# Patient Record
Sex: MALE | Race: NATIVE HAWAIIAN OR OTHER PACIFIC ISLANDER | NOT HISPANIC OR LATINO | URBAN - METROPOLITAN AREA
[De-identification: names, ages, dates, MRNs, and addresses within clinical notes are randomized per-mention and may not be internally consistent; named-entity substitution may affect disease eponyms.]

---

## 2023-09-20 ENCOUNTER — INPATIENT (INPATIENT)
Facility: HOSPITAL | Age: 38
LOS: 2 days | Discharge: NURSING, REHAB OR LONG-TERM CARE | DRG: 897 | End: 2023-09-23
Attending: STUDENT IN AN ORGANIZED HEALTH CARE EDUCATION/TRAINING PROGRAM | Admitting: STUDENT IN AN ORGANIZED HEALTH CARE EDUCATION/TRAINING PROGRAM
Payer: MEDICAID

## 2023-09-20 VITALS
OXYGEN SATURATION: 100 % | RESPIRATION RATE: 22 BRPM | WEIGHT: 199.96 LBS | DIASTOLIC BLOOD PRESSURE: 74 MMHG | HEART RATE: 112 BPM | HEIGHT: 70 IN | SYSTOLIC BLOOD PRESSURE: 144 MMHG

## 2023-09-20 DIAGNOSIS — E80.6 OTHER DISORDERS OF BILIRUBIN METABOLISM: ICD-10-CM

## 2023-09-20 DIAGNOSIS — Z29.9 ENCOUNTER FOR PROPHYLACTIC MEASURES, UNSPECIFIED: ICD-10-CM

## 2023-09-20 DIAGNOSIS — R65.10 SYSTEMIC INFLAMMATORY RESPONSE SYNDROME (SIRS) OF NON-INFECTIOUS ORIGIN WITHOUT ACUTE ORGAN DYSFUNCTION: ICD-10-CM

## 2023-09-20 DIAGNOSIS — R79.89 OTHER SPECIFIED ABNORMAL FINDINGS OF BLOOD CHEMISTRY: ICD-10-CM

## 2023-09-20 DIAGNOSIS — F10.10 ALCOHOL ABUSE, UNCOMPLICATED: ICD-10-CM

## 2023-09-20 DIAGNOSIS — F10.939 ALCOHOL USE, UNSPECIFIED WITH WITHDRAWAL, UNSPECIFIED: ICD-10-CM

## 2023-09-20 DIAGNOSIS — E83.39 OTHER DISORDERS OF PHOSPHORUS METABOLISM: ICD-10-CM

## 2023-09-20 LAB
ALBUMIN SERPL ELPH-MCNC: 4.6 G/DL — SIGNIFICANT CHANGE UP (ref 3.3–5)
ALP SERPL-CCNC: 100 U/L — SIGNIFICANT CHANGE UP (ref 40–120)
ALT FLD-CCNC: 131 U/L — HIGH (ref 10–45)
ANION GAP SERPL CALC-SCNC: 22 MMOL/L — HIGH (ref 5–17)
AST SERPL-CCNC: 181 U/L — HIGH (ref 10–40)
BASOPHILS # BLD AUTO: 0.03 K/UL — SIGNIFICANT CHANGE UP (ref 0–0.2)
BASOPHILS NFR BLD AUTO: 0.5 % — SIGNIFICANT CHANGE UP (ref 0–2)
BILIRUB SERPL-MCNC: 2.6 MG/DL — HIGH (ref 0.2–1.2)
BUN SERPL-MCNC: 12 MG/DL — SIGNIFICANT CHANGE UP (ref 7–23)
CALCIUM SERPL-MCNC: 9.5 MG/DL — SIGNIFICANT CHANGE UP (ref 8.4–10.5)
CHLORIDE SERPL-SCNC: 91 MMOL/L — LOW (ref 96–108)
CO2 SERPL-SCNC: 21 MMOL/L — LOW (ref 22–31)
CREAT SERPL-MCNC: 0.78 MG/DL — SIGNIFICANT CHANGE UP (ref 0.5–1.3)
EGFR: 117 ML/MIN/1.73M2 — SIGNIFICANT CHANGE UP
EOSINOPHIL # BLD AUTO: 0.04 K/UL — SIGNIFICANT CHANGE UP (ref 0–0.5)
EOSINOPHIL NFR BLD AUTO: 0.6 % — SIGNIFICANT CHANGE UP (ref 0–6)
GLUCOSE SERPL-MCNC: 118 MG/DL — HIGH (ref 70–99)
HCT VFR BLD CALC: 43.1 % — SIGNIFICANT CHANGE UP (ref 39–50)
HGB BLD-MCNC: 15.6 G/DL — SIGNIFICANT CHANGE UP (ref 13–17)
IMM GRANULOCYTES NFR BLD AUTO: 0.2 % — SIGNIFICANT CHANGE UP (ref 0–0.9)
LIDOCAIN IGE QN: 32 U/L — SIGNIFICANT CHANGE UP (ref 7–60)
LYMPHOCYTES # BLD AUTO: 2.33 K/UL — SIGNIFICANT CHANGE UP (ref 1–3.3)
LYMPHOCYTES # BLD AUTO: 37.6 % — SIGNIFICANT CHANGE UP (ref 13–44)
MAGNESIUM SERPL-MCNC: 2 MG/DL — SIGNIFICANT CHANGE UP (ref 1.6–2.6)
MCHC RBC-ENTMCNC: 30.5 PG — SIGNIFICANT CHANGE UP (ref 27–34)
MCHC RBC-ENTMCNC: 36.2 GM/DL — HIGH (ref 32–36)
MCV RBC AUTO: 84.2 FL — SIGNIFICANT CHANGE UP (ref 80–100)
MONOCYTES # BLD AUTO: 0.52 K/UL — SIGNIFICANT CHANGE UP (ref 0–0.9)
MONOCYTES NFR BLD AUTO: 8.4 % — SIGNIFICANT CHANGE UP (ref 2–14)
NEUTROPHILS # BLD AUTO: 3.26 K/UL — SIGNIFICANT CHANGE UP (ref 1.8–7.4)
NEUTROPHILS NFR BLD AUTO: 52.7 % — SIGNIFICANT CHANGE UP (ref 43–77)
NRBC # BLD: 0 /100 WBCS — SIGNIFICANT CHANGE UP (ref 0–0)
PLATELET # BLD AUTO: 291 K/UL — SIGNIFICANT CHANGE UP (ref 150–400)
POTASSIUM SERPL-MCNC: 4.1 MMOL/L — SIGNIFICANT CHANGE UP (ref 3.5–5.3)
POTASSIUM SERPL-SCNC: 4.1 MMOL/L — SIGNIFICANT CHANGE UP (ref 3.5–5.3)
PROT SERPL-MCNC: 8.2 G/DL — SIGNIFICANT CHANGE UP (ref 6–8.3)
RBC # BLD: 5.12 M/UL — SIGNIFICANT CHANGE UP (ref 4.2–5.8)
RBC # FLD: 13.9 % — SIGNIFICANT CHANGE UP (ref 10.3–14.5)
SODIUM SERPL-SCNC: 134 MMOL/L — LOW (ref 135–145)
WBC # BLD: 6.19 K/UL — SIGNIFICANT CHANGE UP (ref 3.8–10.5)
WBC # FLD AUTO: 6.19 K/UL — SIGNIFICANT CHANGE UP (ref 3.8–10.5)

## 2023-09-20 PROCEDURE — 99223 1ST HOSP IP/OBS HIGH 75: CPT

## 2023-09-20 PROCEDURE — 93010 ELECTROCARDIOGRAM REPORT: CPT

## 2023-09-20 PROCEDURE — 99291 CRITICAL CARE FIRST HOUR: CPT

## 2023-09-20 RX ORDER — DIAZEPAM 5 MG
10 TABLET ORAL ONCE
Refills: 0 | Status: DISCONTINUED | OUTPATIENT
Start: 2023-09-20 | End: 2023-09-20

## 2023-09-20 RX ORDER — FOLIC ACID 0.8 MG
1 TABLET ORAL DAILY
Refills: 0 | Status: DISCONTINUED | OUTPATIENT
Start: 2023-09-20 | End: 2023-09-23

## 2023-09-20 RX ORDER — SODIUM CHLORIDE 9 MG/ML
1000 INJECTION INTRAMUSCULAR; INTRAVENOUS; SUBCUTANEOUS ONCE
Refills: 0 | Status: COMPLETED | OUTPATIENT
Start: 2023-09-20 | End: 2023-09-20

## 2023-09-20 RX ORDER — THIAMINE MONONITRATE (VIT B1) 100 MG
100 TABLET ORAL DAILY
Refills: 0 | Status: DISCONTINUED | OUTPATIENT
Start: 2023-09-20 | End: 2023-09-23

## 2023-09-20 RX ORDER — FOLIC ACID 0.8 MG
1 TABLET ORAL ONCE
Refills: 0 | Status: COMPLETED | OUTPATIENT
Start: 2023-09-20 | End: 2023-09-20

## 2023-09-20 RX ORDER — ONDANSETRON 8 MG/1
4 TABLET, FILM COATED ORAL EVERY 8 HOURS
Refills: 0 | Status: DISCONTINUED | OUTPATIENT
Start: 2023-09-20 | End: 2023-09-20

## 2023-09-20 RX ORDER — SODIUM CHLORIDE 9 MG/ML
1000 INJECTION INTRAMUSCULAR; INTRAVENOUS; SUBCUTANEOUS
Refills: 0 | Status: DISCONTINUED | OUTPATIENT
Start: 2023-09-20 | End: 2023-09-23

## 2023-09-20 RX ORDER — HEPARIN SODIUM 5000 [USP'U]/ML
5000 INJECTION INTRAVENOUS; SUBCUTANEOUS EVERY 8 HOURS
Refills: 0 | Status: DISCONTINUED | OUTPATIENT
Start: 2023-09-20 | End: 2023-09-22

## 2023-09-20 RX ORDER — ACETAMINOPHEN 500 MG
650 TABLET ORAL EVERY 6 HOURS
Refills: 0 | Status: DISCONTINUED | OUTPATIENT
Start: 2023-09-20 | End: 2023-09-20

## 2023-09-20 RX ORDER — LANOLIN ALCOHOL/MO/W.PET/CERES
3 CREAM (GRAM) TOPICAL AT BEDTIME
Refills: 0 | Status: DISCONTINUED | OUTPATIENT
Start: 2023-09-20 | End: 2023-09-23

## 2023-09-20 RX ORDER — THIAMINE MONONITRATE (VIT B1) 100 MG
100 TABLET ORAL ONCE
Refills: 0 | Status: COMPLETED | OUTPATIENT
Start: 2023-09-20 | End: 2023-09-20

## 2023-09-20 RX ORDER — DIAZEPAM 5 MG
5 TABLET ORAL ONCE
Refills: 0 | Status: DISCONTINUED | OUTPATIENT
Start: 2023-09-20 | End: 2023-09-20

## 2023-09-20 RX ADMIN — Medication 75 MILLIGRAM(S): at 23:44

## 2023-09-20 RX ADMIN — Medication 10 MILLIGRAM(S): at 21:33

## 2023-09-20 RX ADMIN — SODIUM CHLORIDE 1000 MILLILITER(S): 9 INJECTION INTRAMUSCULAR; INTRAVENOUS; SUBCUTANEOUS at 21:30

## 2023-09-20 RX ADMIN — Medication 1 MILLIGRAM(S): at 22:47

## 2023-09-20 RX ADMIN — Medication 100 MILLIGRAM(S): at 21:49

## 2023-09-20 RX ADMIN — Medication 5 MILLIGRAM(S): at 21:49

## 2023-09-20 RX ADMIN — Medication 1 TABLET(S): at 23:44

## 2023-09-20 NOTE — H&P ADULT - ASSESSMENT
38 yo M with history of alcohol abuse with previous withdrawals but never experienced seizures/intubations, anxiety presenting with alcohol withdrawal.

## 2023-09-20 NOTE — H&P ADULT - HISTORY OF PRESENT ILLNESS
36 yo M with history of alcohol abuse with previous withdrawals but never experienced seizures/intubations, anxiety presenting with alcohol withdrawal. Patient reports he was visiting his mother at the hospital and stayed over for 24 hours. he started experiencing tremors, anxiety and diaphoresis so he decided to go to the ED. Patient reports he usually drinks two 24 ounces of beer per day. He denies any drug use.     In the ED, patient was afebrile /74, , tachypneic 22. CBC unremarkable. CMP with mild hyponatremia 134. AG 22. Tot bilirubin 2.6.  and . Lipase normal. EKG showed sinus tach, no ischemic changes, QTc 473. Patient with CIWA 17 for anxiety/tremors/diaphoresis, and received valium 5 mg and 10 mg.  38 yo M with history of alcohol abuse with previous withdrawals but never experienced seizures/intubations, anxiety presenting with alcohol withdrawal. Patient reports he was visiting his mother at the hospital and stayed over for 24 hours. he started experiencing tremors, anxiety and diaphoresis so he decided to go to the ED. Patient reports he has been drinking on and off for years, reports he usually drinks 3-4 24 ounces of beer per day. He denies any drug use. He reports being admitted in [place] for alcohol withdrawal in 2014, uncomplicated.     In the ED, patient was afebrile /74, , tachypneic 22. CBC unremarkable. CMP with mild hyponatremia 134. AG 22. Tot bilirubin 2.6.  and . Lipase normal. EKG showed sinus tach, no ischemic changes, QTc 473. Patient with CIWA 17 for anxiety/tremors/diaphoresis, and received valium 5 mg and 10 mg.

## 2023-09-20 NOTE — H&P ADULT - NSHPLABSRESULTS_GEN_ALL_CORE
15.6   6.19  )-----------( 291      ( 20 Sep 2023 21:32 )             43.1       09-20    134<L>  |  91<L>  |  12  ----------------------------<  118<H>  4.1   |  21<L>  |  0.78    Ca    9.5      20 Sep 2023 21:32  Mg     2.0     09-20    TPro  8.2  /  Alb  4.6  /  TBili  2.6<H>  /  DBili  x   /  AST  181<H>  /  ALT  131<H>  /  AlkPhos  100  09-20              Urinalysis Basic - ( 20 Sep 2023 21:32 )    Color: x / Appearance: x / SG: x / pH: x  Gluc: 118 mg/dL / Ketone: x  / Bili: x / Urobili: x   Blood: x / Protein: x / Nitrite: x   Leuk Esterase: x / RBC: x / WBC x   Sq Epi: x / Non Sq Epi: x / Bacteria: x                  CAPILLARY BLOOD GLUCOSE      POCT Blood Glucose.: 111 mg/dL (20 Sep 2023 21:26)

## 2023-09-20 NOTE — H&P ADULT - NSHPPHYSICALEXAM_GEN_ALL_CORE
VITAL SIGNS:  T(C): 36.5 (09-20-23 @ 21:45), Max: 36.5 (09-20-23 @ 21:45)  T(F): 97.7 (09-20-23 @ 21:45), Max: 97.7 (09-20-23 @ 21:45)  HR: 78 (09-20-23 @ 21:45) (78 - 112)  BP: 138/72 (09-20-23 @ 21:45) (138/72 - 144/74)  BP(mean): --  RR: 18 (09-20-23 @ 21:45) (18 - 22)  SpO2: 100% (09-20-23 @ 21:45) (100% - 100%)  Wt(kg): --    PHYSICAL EXAM:    Constitutional: NAD  Head: NC/AT  Eyes: PERRL, EOMI, anicteric sclera  ENT: no nasal discharge; uvula midline, no oropharyngeal erythema or exudates; MMM  Neck: supple; no JVD or thyromegaly  Respiratory: CTA B/L; no W/R/R, no retractions  Cardiac: +S1/S2; RRR; no M/R/G; PMI non-displaced  Gastrointestinal: abdomen soft, NT/ND; no rebound or guarding; +BSx4  Back: spine midline, no bony tenderness or step-offs; no CVAT B/L  Extremities: WWP, no clubbing or cyanosis; no peripheral edema  Musculoskeletal: NROM x4; no joint swelling, tenderness or erythema  Vascular: 2+ radial, femoral, DP/PT pulses B/L  Dermatologic: skin warm, dry and intact; no rashes, wounds, or scars  Lymphatic: no submandibular or cervical LAD  Neurologic: AAOx3; CNII-XII grossly intact; no focal deficits VITAL SIGNS:  T(C): 36.5 (09-20-23 @ 21:45), Max: 36.5 (09-20-23 @ 21:45)  T(F): 97.7 (09-20-23 @ 21:45), Max: 97.7 (09-20-23 @ 21:45)  HR: 78 (09-20-23 @ 21:45) (78 - 112)  BP: 138/72 (09-20-23 @ 21:45) (138/72 - 144/74)  BP(mean): --  RR: 18 (09-20-23 @ 21:45) (18 - 22)  SpO2: 100% (09-20-23 @ 21:45) (100% - 100%)  Wt(kg): --    PHYSICAL EXAM:    Constitutional: anxious appearing  Head: NC/AT  Eyes: PERRL, EOMI, anicteric sclera  ENT: no nasal discharge; uvula midline, no oropharyngeal erythema or exudates; MMM  Neck: supple; no JVD or thyromegaly  Respiratory: CTA B/L; no W/R/R, no retractions  Cardiac: +S1/S2; RRR; no M/R/G; PMI non-displaced  Gastrointestinal: abdomen soft, NT/ND; no rebound or guarding; +BSx4  Back: spine midline, no bony tenderness or step-offs; no CVAT B/L  Extremities: WWP, no clubbing or cyanosis; no peripheral edema  Musculoskeletal: NROM x4; no joint swelling, tenderness or erythema  Vascular: 2+ radial, femoral, DP/PT pulses B/L  Dermatologic: skin warm, dry and intact; no rashes, wounds, or scars  Lymphatic: no submandibular or cervical LAD  Neurologic: AAOx3; CNII-XII grossly intact; no focal deficits, tremors with arms extended  CIWA 9 at 12h30 am.

## 2023-09-20 NOTE — ED ADULT NURSE NOTE - NSFALLUNIVINTERV_ED_ALL_ED
Bed/Stretcher in lowest position, wheels locked, appropriate side rails in place/Call bell, personal items and telephone in reach/Instruct patient to call for assistance before getting out of bed/chair/stretcher/Non-slip footwear applied when patient is off stretcher/[place] to call system/Physically safe environment - no spills, clutter or unnecessary equipment/Purposeful proactive rounding/Room/bathroom lighting operational, light cord in reach

## 2023-09-20 NOTE — ED PROVIDER NOTE - OBJECTIVE STATEMENT
37 year old male with history of anxiety, etoh dependence, presenting with acute etoh withdrawl. States he is currently visiting a family member in hospital, drinks about 2 24oz beers per day, last drink yesterday afternoon, has started to feel tremulous, anxious, and diaphoretic. Brought down to ED for further eval from [facility]. Denies recent trauma. Denies other drug use. Denies previous hx of etoh related seizures but has had hospitalizations previously for withdrawal in [place] where he is from.

## 2023-09-20 NOTE — ED PROVIDER NOTE - PROGRESS NOTE DETAILS
Emiliano King MD: Patient reassessed after 10mg + 5mg IV valium doses, tremors significantly improved, HR now in 80s, patient feels much better. Emiliano King MD: rpt CIWA 9 (from 17). Will admit for further monitoring and CIWA taper.

## 2023-09-20 NOTE — H&P ADULT - ATTENDING COMMENTS
38 yo M with PMHx anxiety, daily etOH use (no withdrawals/seizures, last drink 9/19 AM) px to ED after experiencing tremors and generalized anxiety while visiting his mother who is a patient at [facility] being admitted to [facility] for further monitoring of etOH withdrawal.     #etOH withdrawal – Pt reports daily etOH use, last drink 9/19 AM. No hx of seizures, hospitalizations, intubations. Has been visiting his mother who is currently admitted at [facility] and as a result, went 24hrs without etOH consumption leading to current sx. On my exam ~11PM, CIWA 8 (+1 nausea, +4 tremor, +2 diaphoresis, +1 anxiety). s/p Valium 5mg IVP x2 in ED. Continue CIWA protocol. Librium 75mg Q8. Taper PRN. Ativan PRN for CIWA >8. Monitor 24hr Ativan requirements. Continue MV, thiamine, folic acid.      Agree with remainder of resident plan as above.

## 2023-09-20 NOTE — ED PROVIDER NOTE - CLINICAL SUMMARY MEDICAL DECISION MAKING FREE TEXT BOX
37 year old male with history of anxiety, etoh dependence, presenting with acute etoh withdrawl. 37 year old male with history of anxiety, etoh dependence, presenting with acute etoh withdrawl. Somewhat toxic appearing here on arrival--tachycardic, tremulous, anxious, diaphoretic. Seen emergently as clinical upgrade, given valium 10mg dose with good response, additional 5mg dose given (see progress notes). Will need admission for high CIWA / taper / monitoring.

## 2023-09-20 NOTE — ED PROVIDER NOTE - PHYSICAL EXAMINATION
Gen - Grossly tremulous and diaphoretic but mentating well and following all commands; A+Ox3   HEENT - NCAT, EOMI, dry mucous membranes, clear oropharynx, +tongue fasciulations  Neck - supple  Resp - CTAB, no increased WOB  CV - tachycardic; no m/r/g  Abd - soft, NT, ND; no guarding or rebound  MSK - FROM of b/l UE and LE, no gross deformities  Extrem - no LE edema  Neuro - no focal motor or sensation deficits  Skin - warm, well perfused; diaphoretic

## 2023-09-20 NOTE — H&P ADULT - PROBLEM SELECTOR PLAN 3
Abd exam benign.   - Follow up fractionated bilirubin for further workup   - Consider RUQ US if uptrending on repeat labs Abd exam benign.   - Follow up fractionated bilirubin for further workup ---> unconjugated hyperbilirubinemia most likely in the setting of alcohol abuse  - Consider RUQ US if uptrending on repeat labs

## 2023-09-20 NOTE — H&P ADULT - PROBLEM SELECTOR PLAN 1
Patient with history of previous alcohol withdrawal. No seizures in the past.   - Continue with CIWA protocol   - Started librium 75 mg qd ---> taper daily

## 2023-09-20 NOTE — ED PROVIDER NOTE - CRITICAL CARE ATTENDING CONTRIBUTION TO CARE
Emiliano King MD:    Due to a high probability of clinically significant, life threatening deterioration, the patient required my highest level of preparedness to intervene emergently and I personally spent this critical care time directly and personally managing the patient. This critical care time included obtaining a history; examining the patient; pulse oximetry; ordering and review of studies; arranging urgent treatment with development of a management plan; evaluation of patient's response to treatment; frequent reassessment; and, discussions with other providers.  This critical care time was performed to assess and manage the high probability of imminent, life-threatening deterioration that could result in multi-organ failure. It was exclusive of separately billable procedures and treating other patients and teaching time.

## 2023-09-20 NOTE — ED ADULT NURSE NOTE - OBJECTIVE STATEMENT
Pt. to ED room 1 from a unit upstairs  Pt. visiting his mother  Pt. came to the ED for withdraw from alcohol  Last drink 2 days ago  Pt. attempted to stop cold turkey  Pt. came to ED with severe tremors  Pt. placed on the continuous cardiac monitor and pulse ox   #20 Gauge IV in right AC

## 2023-09-21 DIAGNOSIS — E87.6 HYPOKALEMIA: ICD-10-CM

## 2023-09-21 LAB
ALBUMIN SERPL ELPH-MCNC: 3.8 G/DL — SIGNIFICANT CHANGE UP (ref 3.3–5)
ALBUMIN SERPL ELPH-MCNC: 3.9 G/DL — SIGNIFICANT CHANGE UP (ref 3.3–5)
ALP SERPL-CCNC: 80 U/L — SIGNIFICANT CHANGE UP (ref 40–120)
ALP SERPL-CCNC: 82 U/L — SIGNIFICANT CHANGE UP (ref 40–120)
ALT FLD-CCNC: 100 U/L — HIGH (ref 10–45)
ALT FLD-CCNC: 105 U/L — HIGH (ref 10–45)
ANION GAP SERPL CALC-SCNC: 14 MMOL/L — SIGNIFICANT CHANGE UP (ref 5–17)
AST SERPL-CCNC: 126 U/L — HIGH (ref 10–40)
AST SERPL-CCNC: 128 U/L — HIGH (ref 10–40)
BASOPHILS # BLD AUTO: 0.01 K/UL — SIGNIFICANT CHANGE UP (ref 0–0.2)
BASOPHILS NFR BLD AUTO: 0.2 % — SIGNIFICANT CHANGE UP (ref 0–2)
BILIRUB DIRECT SERPL-MCNC: 0.4 MG/DL — HIGH (ref 0–0.3)
BILIRUB DIRECT SERPL-MCNC: 0.4 MG/DL — HIGH (ref 0–0.3)
BILIRUB INDIRECT FLD-MCNC: 1.8 MG/DL — HIGH (ref 0.2–1)
BILIRUB INDIRECT FLD-MCNC: 2.2 MG/DL — HIGH (ref 0.2–1)
BILIRUB SERPL-MCNC: 2.2 MG/DL — HIGH (ref 0.2–1.2)
BILIRUB SERPL-MCNC: 2.2 MG/DL — HIGH (ref 0.2–1.2)
BILIRUB SERPL-MCNC: 2.6 MG/DL — HIGH (ref 0.2–1.2)
BUN SERPL-MCNC: 15 MG/DL — SIGNIFICANT CHANGE UP (ref 7–23)
CALCIUM SERPL-MCNC: 8.8 MG/DL — SIGNIFICANT CHANGE UP (ref 8.4–10.5)
CHLORIDE SERPL-SCNC: 97 MMOL/L — SIGNIFICANT CHANGE UP (ref 96–108)
CO2 SERPL-SCNC: 26 MMOL/L — SIGNIFICANT CHANGE UP (ref 22–31)
CREAT SERPL-MCNC: 0.79 MG/DL — SIGNIFICANT CHANGE UP (ref 0.5–1.3)
EGFR: 117 ML/MIN/1.73M2 — SIGNIFICANT CHANGE UP
EOSINOPHIL # BLD AUTO: 0.14 K/UL — SIGNIFICANT CHANGE UP (ref 0–0.5)
EOSINOPHIL NFR BLD AUTO: 3.3 % — SIGNIFICANT CHANGE UP (ref 0–6)
GLUCOSE SERPL-MCNC: 106 MG/DL — HIGH (ref 70–99)
HCT VFR BLD CALC: 39.5 % — SIGNIFICANT CHANGE UP (ref 39–50)
HGB BLD-MCNC: 13.4 G/DL — SIGNIFICANT CHANGE UP (ref 13–17)
IMM GRANULOCYTES NFR BLD AUTO: 0 % — SIGNIFICANT CHANGE UP (ref 0–0.9)
LYMPHOCYTES # BLD AUTO: 1.54 K/UL — SIGNIFICANT CHANGE UP (ref 1–3.3)
LYMPHOCYTES # BLD AUTO: 36.7 % — SIGNIFICANT CHANGE UP (ref 13–44)
MAGNESIUM SERPL-MCNC: 2 MG/DL — SIGNIFICANT CHANGE UP (ref 1.6–2.6)
MCHC RBC-ENTMCNC: 29.8 PG — SIGNIFICANT CHANGE UP (ref 27–34)
MCHC RBC-ENTMCNC: 33.9 GM/DL — SIGNIFICANT CHANGE UP (ref 32–36)
MCV RBC AUTO: 87.8 FL — SIGNIFICANT CHANGE UP (ref 80–100)
MONOCYTES # BLD AUTO: 0.33 K/UL — SIGNIFICANT CHANGE UP (ref 0–0.9)
MONOCYTES NFR BLD AUTO: 7.9 % — SIGNIFICANT CHANGE UP (ref 2–14)
NEUTROPHILS # BLD AUTO: 2.18 K/UL — SIGNIFICANT CHANGE UP (ref 1.8–7.4)
NEUTROPHILS NFR BLD AUTO: 51.9 % — SIGNIFICANT CHANGE UP (ref 43–77)
NRBC # BLD: 0 /100 WBCS — SIGNIFICANT CHANGE UP (ref 0–0)
PHOSPHATE SERPL-MCNC: 3.3 MG/DL — SIGNIFICANT CHANGE UP (ref 2.5–4.5)
PLATELET # BLD AUTO: 207 K/UL — SIGNIFICANT CHANGE UP (ref 150–400)
POTASSIUM SERPL-MCNC: 3 MMOL/L — LOW (ref 3.5–5.3)
POTASSIUM SERPL-SCNC: 3 MMOL/L — LOW (ref 3.5–5.3)
PROT SERPL-MCNC: 6.9 G/DL — SIGNIFICANT CHANGE UP (ref 6–8.3)
PROT SERPL-MCNC: 7.1 G/DL — SIGNIFICANT CHANGE UP (ref 6–8.3)
RBC # BLD: 4.5 M/UL — SIGNIFICANT CHANGE UP (ref 4.2–5.8)
RBC # FLD: 14.2 % — SIGNIFICANT CHANGE UP (ref 10.3–14.5)
SODIUM SERPL-SCNC: 137 MMOL/L — SIGNIFICANT CHANGE UP (ref 135–145)
WBC # BLD: 4.2 K/UL — SIGNIFICANT CHANGE UP (ref 3.8–10.5)
WBC # FLD AUTO: 4.2 K/UL — SIGNIFICANT CHANGE UP (ref 3.8–10.5)

## 2023-09-21 PROCEDURE — 99233 SBSQ HOSP IP/OBS HIGH 50: CPT | Mod: GC

## 2023-09-21 RX ORDER — POTASSIUM CHLORIDE 20 MEQ
10 PACKET (EA) ORAL
Refills: 0 | Status: COMPLETED | OUTPATIENT
Start: 2023-09-21 | End: 2023-09-21

## 2023-09-21 RX ORDER — POTASSIUM CHLORIDE 20 MEQ
40 PACKET (EA) ORAL ONCE
Refills: 0 | Status: COMPLETED | OUTPATIENT
Start: 2023-09-21 | End: 2023-09-21

## 2023-09-21 RX ADMIN — Medication 1 MILLIGRAM(S): at 00:50

## 2023-09-21 RX ADMIN — Medication 1 MILLIGRAM(S): at 05:18

## 2023-09-21 RX ADMIN — HEPARIN SODIUM 5000 UNIT(S): 5000 INJECTION INTRAVENOUS; SUBCUTANEOUS at 00:00

## 2023-09-21 RX ADMIN — HEPARIN SODIUM 5000 UNIT(S): 5000 INJECTION INTRAVENOUS; SUBCUTANEOUS at 22:19

## 2023-09-21 RX ADMIN — Medication 75 MILLIGRAM(S): at 05:17

## 2023-09-21 RX ADMIN — Medication 75 MILLIGRAM(S): at 22:19

## 2023-09-21 RX ADMIN — SODIUM CHLORIDE 100 MILLILITER(S): 9 INJECTION INTRAMUSCULAR; INTRAVENOUS; SUBCUTANEOUS at 05:21

## 2023-09-21 RX ADMIN — Medication 1 MILLIGRAM(S): at 11:55

## 2023-09-21 RX ADMIN — Medication 100 MILLIEQUIVALENT(S): at 09:08

## 2023-09-21 RX ADMIN — Medication 40 MILLIEQUIVALENT(S): at 06:50

## 2023-09-21 RX ADMIN — Medication 1 TABLET(S): at 11:56

## 2023-09-21 RX ADMIN — HEPARIN SODIUM 5000 UNIT(S): 5000 INJECTION INTRAVENOUS; SUBCUTANEOUS at 14:07

## 2023-09-21 RX ADMIN — Medication 75 MILLIGRAM(S): at 14:07

## 2023-09-21 RX ADMIN — Medication 100 MILLIGRAM(S): at 11:55

## 2023-09-21 RX ADMIN — Medication 100 MILLIEQUIVALENT(S): at 06:51

## 2023-09-21 RX ADMIN — Medication 100 MILLIEQUIVALENT(S): at 11:11

## 2023-09-21 NOTE — PROGRESS NOTE ADULT - PROBLEM SELECTOR PLAN 2
likely due to EtOH use; #s normalizing; check viral hepatitis panel; monitor LFTs, will check RUQ U/S if #s worsening

## 2023-09-21 NOTE — PROGRESS NOTE ADULT - PROBLEM SELECTOR PLAN 4
Most likely in the setting of alcohol abuse  - Avoid hepatotoxic meds Most likely in the setting of alcohol abuse  - See above  - Avoid hepatotoxic meds

## 2023-09-21 NOTE — PROGRESS NOTE ADULT - PROBLEM SELECTOR PLAN 3
Abd exam benign.   - Follow up fractionated bilirubin for further workup ---> unconjugated hyperbilirubinemia most likely in the setting of alcohol abuse  - Consider RUQ US if uptrending on repeat labs Abd exam benign.   - AST//105 this AM, down from 181/131  - Direct bili 0.4, indirect 1.8  - Likely 2/2 alcohol use, AST/ALT ratio > 1 w/ mixed hyperbilirubinemia and h/o alcohol abuse and withdrawals  - No indication for RUQ at this moment, consider in setting of rising abnormal LFTs or exam change

## 2023-09-21 NOTE — PROGRESS NOTE ADULT - PROBLEM SELECTOR PLAN 5
F: PO  E: replete PRN  Diet: regular  GI PPx: none  DVT PPx: hep subQ  Code: FULL  Dispo: [facility] - K 3.0 this AM, down from 4.1 yesterday  - Repleted w/ KCl

## 2023-09-21 NOTE — PROGRESS NOTE ADULT - PROBLEM SELECTOR PLAN 1
last drink reportedly 9/19; clinically improved; cont. Librium, serial CIWAs, Ativan PRN; thiamine, folate, MVI; SBIRT SW eval for cessation resources; can probably start tapering benzos tomorrow

## 2023-09-21 NOTE — PROGRESS NOTE ADULT - PROBLEM SELECTOR PLAN 1
Patient with history of previous alcohol withdrawal. No seizures in the past.   - Continue with CIWA protocol   - Started librium 75 mg qd ---> taper daily Patient with history of previous alcohol withdrawal. No seizures in the past.   - Continue with CIWA protocol   - Started librium 75 mg qd ---> taper daily  - Ativan 1 mg q hourly PRN for CIWA > 8 Patient with history of previous alcohol withdrawal. No seizures in the past.    - Continue with CIWA protocol   - Started librium 75 mg qd ---> taper daily  - Ativan 1 mg q hourly PRN for CIWA > 8

## 2023-09-21 NOTE — PROGRESS NOTE ADULT - SUBJECTIVE AND OBJECTIVE BOX
Patient is a 37y old  Male who presents with a chief complaint of     INTERVAL HPI/OVERNIGHT EVENTS:    Pt. seen and examined earlier today  Pt. reports feeling much better, withdrawal sx better controlled, less anxiety and tremor, sweating resolved  Pt. denies F/C, CP, SOB, HA, N/V, hallucinations    Review of Systems: 12 point review of systems otherwise negative    MEDICATIONS  (STANDING):  chlordiazePOXIDE 75 milliGRAM(s) Oral every 8 hours  folic acid 1 milliGRAM(s) Oral daily  heparin   Injectable 5000 Unit(s) SubCutaneous every 8 hours  multivitamin 1 Tablet(s) Oral daily  sodium chloride 0.9%. 1000 milliLiter(s) (100 mL/Hr) IV Continuous <Continuous>  thiamine 100 milliGRAM(s) Oral daily    MEDICATIONS  (PRN):  LORazepam   Injectable 1 milliGRAM(s) IV Push every 1 hour PRN CIWA-Ar score 8 or greater  melatonin 3 milliGRAM(s) Oral at bedtime PRN Insomnia      Allergies    No Known Allergies    Intolerances          Vital Signs Last 24 Hrs  T(C): 36.8 (21 Sep 2023 14:34), Max: 36.8 (21 Sep 2023 10:45)  T(F): 98.2 (21 Sep 2023 14:34), Max: 98.3 (21 Sep 2023 10:45)  HR: 90 (21 Sep 2023 14:34) (61 - 112)  BP: 132/87 (21 Sep 2023 14:34) (132/87 - 150/94)  BP(mean): --  RR: 16 (21 Sep 2023 14:34) (16 - 22)  SpO2: 96% (21 Sep 2023 14:34) (96% - 100%)    Parameters below as of 21 Sep 2023 14:34  Patient On (Oxygen Delivery Method): room air      CAPILLARY BLOOD GLUCOSE      POCT Blood Glucose.: 111 mg/dL (20 Sep 2023 21:26)        Physical Exam:  (earlier today)  Daily Height in cm: 177.8 (20 Sep 2023 21:23)    Daily   General:  comfortable-appearing in NAD, calm and cooperative  HEENT:  MMM, anicteric  CV:  RRR  Lungs:  CTA B/L  Abdomen:  soft NT ND  Extremities:  no edema B/L LE  Skin:  WWP, no visible sweat  Neuro:  AAOx3, mild postural hand tremor    LABS:                        13.4   4.20  )-----------( 207      ( 21 Sep 2023 05:00 )             39.5     09-21    137  |  97  |  15  ----------------------------<  106<H>  3.0<L>   |  26  |  0.79    Ca    8.8      21 Sep 2023 05:00  Phos  3.3     09-21  Mg     2.0     09-21    TPro  7.1  /  Alb  3.9  /  TBili  2.2<H>  /  DBili  0.4<H>  /  AST  126<H>  /  ALT  105<H>  /  AlkPhos  82  09-21      Urinalysis Basic - ( 21 Sep 2023 05:00 )    Color: x / Appearance: x / SG: x / pH: x  Gluc: 106 mg/dL / Ketone: x  / Bili: x / Urobili: x   Blood: x / Protein: x / Nitrite: x   Leuk Esterase: x / RBC: x / WBC x   Sq Epi: x / Non Sq Epi: x / Bacteria: x

## 2023-09-21 NOTE — PROGRESS NOTE ADULT - SUBJECTIVE AND OBJECTIVE BOX
*****INCOMPLETE NOTE*****    INTERVAL HPI/OVERNIGHT EVENTS:    SUBJECTIVE: Patient seen and examined at bedside, resting comfortably in bed, and does not appear to be in any acute distress. Patient states  Patient denies any recent fevers, chills, nausea, vomiting, headache, acute sob, chest pain, abdominal pain, genitourinary sx, extremity pain or swelling.     ANTIBIOTICS/RELEVANT:    MEDICATIONS  (STANDING):  chlordiazePOXIDE 75 milliGRAM(s) Oral every 8 hours  folic acid 1 milliGRAM(s) Oral daily  heparin   Injectable 5000 Unit(s) SubCutaneous every 8 hours  multivitamin 1 Tablet(s) Oral daily  potassium chloride  10 mEq/100 mL IVPB 10 milliEquivalent(s) IV Intermittent every 1 hour  sodium chloride 0.9%. 1000 milliLiter(s) (100 mL/Hr) IV Continuous <Continuous>  thiamine 100 milliGRAM(s) Oral daily    MEDICATIONS  (PRN):  LORazepam   Injectable 1 milliGRAM(s) IV Push every 1 hour PRN CIWA-Ar score 8 or greater  melatonin 3 milliGRAM(s) Oral at bedtime PRN Insomnia      Vital Signs Last 24 Hrs  T(C): 36.8 (21 Sep 2023 10:45), Max: 36.8 (21 Sep 2023 10:45)  T(F): 98.3 (21 Sep 2023 10:45), Max: 98.3 (21 Sep 2023 10:45)  HR: 83 (21 Sep 2023 10:45) (61 - 112)  BP: 138/93 (21 Sep 2023 10:45) (137/91 - 150/94)  BP(mean): --  RR: 16 (21 Sep 2023 10:45) (16 - 22)  SpO2: 98% (21 Sep 2023 10:45) (97% - 100%)    Parameters below as of 21 Sep 2023 10:45  Patient On (Oxygen Delivery Method): room air        PHYSICAL EXAM:  General: in no acute distress  Eyes: EOMI intact bilaterally. Anicteric sclerae, moist conjunctivae  HENT: Moist mucous membranes  Neck: Trachea midline, supple  Lungs: CTA B/L. No wheezes, rales, or rhonchi  Cardiovascular: RRR. No murmurs, rubs, or gallops  Abdomen: Soft, non-tender non-distended; No rebound or guarding  Extremities: WWP, No clubbing, cyanosis or edema  Neurological: Alert and oriented  Skin: Warm and dry. No obvious rash     LABS:                        13.4   4.20  )-----------( 207      ( 21 Sep 2023 05:00 )             39.5     09-21    137  |  97  |  15  ----------------------------<  106<H>  3.0<L>   |  26  |  0.79    Ca    8.8      21 Sep 2023 05:00  Phos  3.3     09-21  Mg     2.0     09-21    TPro  7.1  /  Alb  3.9  /  TBili  2.2<H>  /  DBili  0.4<H>  /  AST  126<H>  /  ALT  105<H>  /  AlkPhos  82  09-21      Urinalysis Basic - ( 21 Sep 2023 05:00 )    Color: x / Appearance: x / SG: x / pH: x  Gluc: 106 mg/dL / Ketone: x  / Bili: x / Urobili: x   Blood: x / Protein: x / Nitrite: x   Leuk Esterase: x / RBC: x / WBC x   Sq Epi: x / Non Sq Epi: x / Bacteria: x        MICROBIOLOGY:    RADIOLOGY & ADDITIONAL STUDIES: *****INCOMPLETE NOTE*****    INTERVAL HPI/OVERNIGHT EVENTS: Pt received ativan at 12 AM and 5 AM. Otherwise, FELICE o/n  SUBJECTIVE: Patient seen and examined at bedside, resting comfortably in bed, and does not appear to be in any acute distress. Patient states subjective improvement in symptoms. Feels less anxious, less diaphoretic.   Patient denies any recent fevers, chills, nausea, vomiting, headache, acute sob, chest pain, abdominal pain, genitourinary sx, extremity pain or swelling.  Last BM yesterday reported to be normal.     CIWA score 7 on exam, s/p ativan this AM.      ANTIBIOTICS/RELEVANT:    MEDICATIONS  (STANDING):  chlordiazePOXIDE 75 milliGRAM(s) Oral every 8 hours  folic acid 1 milliGRAM(s) Oral daily  heparin   Injectable 5000 Unit(s) SubCutaneous every 8 hours  multivitamin 1 Tablet(s) Oral daily  potassium chloride  10 mEq/100 mL IVPB 10 milliEquivalent(s) IV Intermittent every 1 hour  sodium chloride 0.9%. 1000 milliLiter(s) (100 mL/Hr) IV Continuous <Continuous>  thiamine 100 milliGRAM(s) Oral daily    MEDICATIONS  (PRN):  LORazepam   Injectable 1 milliGRAM(s) IV Push every 1 hour PRN CIWA-Ar score 8 or greater  melatonin 3 milliGRAM(s) Oral at bedtime PRN Insomnia      Vital Signs Last 24 Hrs  T(C): 36.8 (21 Sep 2023 10:45), Max: 36.8 (21 Sep 2023 10:45)  T(F): 98.3 (21 Sep 2023 10:45), Max: 98.3 (21 Sep 2023 10:45)  HR: 83 (21 Sep 2023 10:45) (61 - 112)  BP: 138/93 (21 Sep 2023 10:45) (137/91 - 150/94)  BP(mean): --  RR: 16 (21 Sep 2023 10:45) (16 - 22)  SpO2: 98% (21 Sep 2023 10:45) (97% - 100%)    Parameters below as of 21 Sep 2023 10:45  Patient On (Oxygen Delivery Method): room air        PHYSICAL EXAM:  General: in no acute distress  Eyes: EOMI intact bilaterally. Anicteric sclerae, moist conjunctivae  HENT: Moist mucous membranes  Neck: Trachea midline, supple  Lungs: CTA B/L. No wheezes, rales, or rhonchi  Cardiovascular: RRR. No murmurs, rubs, or gallops  Abdomen: Soft, non-tender non-distended; No rebound or guarding  Extremities: Mild tremors b/l hands on extension, WWP, No clubbing, cyanosis or edema  Neurological: Alert and oriented  Skin: Warm and dry. No obvious rash     LABS:                        13.4   4.20  )-----------( 207      ( 21 Sep 2023 05:00 )             39.5     09-21    137  |  97  |  15  ----------------------------<  106<H>  3.0<L>   |  26  |  0.79    Ca    8.8      21 Sep 2023 05:00  Phos  3.3     09-21  Mg     2.0     09-21    TPro  7.1  /  Alb  3.9  /  TBili  2.2<H>  /  DBili  0.4<H>  /  AST  126<H>  /  ALT  105<H>  /  AlkPhos  82  09-21      Urinalysis Basic - ( 21 Sep 2023 05:00 )    Color: x / Appearance: x / SG: x / pH: x  Gluc: 106 mg/dL / Ketone: x  / Bili: x / Urobili: x   Blood: x / Protein: x / Nitrite: x   Leuk Esterase: x / RBC: x / WBC x   Sq Epi: x / Non Sq Epi: x / Bacteria: x        MICROBIOLOGY:    RADIOLOGY & ADDITIONAL STUDIES: INTERVAL HPI/OVERNIGHT EVENTS: Pt received ativan at 12 AM and 5 AM. Otherwise, FELICE o/n    SUBJECTIVE: Patient seen and examined at bedside, resting comfortably in bed, and does not appear to be in any acute distress. Patient states subjective improvement in symptoms. Feels less anxious, less diaphoretic.   Patient denies any recent fevers, chills, nausea, vomiting, headache, acute sob, chest pain, abdominal pain, genitourinary sx, extremity pain or swelling.  Last BM yesterday reported to be normal.     CIWA score 7 on exam, s/p ativan this AM.      MEDICATIONS  (STANDING):  chlordiazePOXIDE 75 milliGRAM(s) Oral every 8 hours  folic acid 1 milliGRAM(s) Oral daily  heparin   Injectable 5000 Unit(s) SubCutaneous every 8 hours  multivitamin 1 Tablet(s) Oral daily  potassium chloride  10 mEq/100 mL IVPB 10 milliEquivalent(s) IV Intermittent every 1 hour  sodium chloride 0.9%. 1000 milliLiter(s) (100 mL/Hr) IV Continuous <Continuous>  thiamine 100 milliGRAM(s) Oral daily    MEDICATIONS  (PRN):  LORazepam   Injectable 1 milliGRAM(s) IV Push every 1 hour PRN CIWA-Ar score 8 or greater  melatonin 3 milliGRAM(s) Oral at bedtime PRN Insomnia      Vital Signs Last 24 Hrs  T(C): 36.8 (21 Sep 2023 10:45), Max: 36.8 (21 Sep 2023 10:45)  T(F): 98.3 (21 Sep 2023 10:45), Max: 98.3 (21 Sep 2023 10:45)  HR: 83 (21 Sep 2023 10:45) (61 - 112)  BP: 138/93 (21 Sep 2023 10:45) (137/91 - 150/94)  BP(mean): --  RR: 16 (21 Sep 2023 10:45) (16 - 22)  SpO2: 98% (21 Sep 2023 10:45) (97% - 100%)    Parameters below as of 21 Sep 2023 10:45  Patient On (Oxygen Delivery Method): room air        PHYSICAL EXAM:  General: in no acute distress  Eyes: EOMI intact bilaterally. Anicteric sclerae, pupils 4mm bilaterally  HENT: Moist mucous membranes  Neck: Trachea midline  Lungs: CTA B/L.  Cardiovascular: RRR  Abdomen: Soft, non-tender non-distended; No rebound or guarding  Extremities: Mild tremors b/l hands on extension, WWP, no piloerection  Neurological: Alert and oriented  Skin: Warm and dry    LABS:                        13.4   4.20  )-----------( 207      ( 21 Sep 2023 05:00 )             39.5     09-21    137  |  97  |  15  ----------------------------<  106<H>  3.0<L>   |  26  |  0.79    Ca    8.8      21 Sep 2023 05:00  Phos  3.3     09-21  Mg     2.0     09-21    TPro  7.1  /  Alb  3.9  /  TBili  2.2<H>  /  DBili  0.4<H>  /  AST  126<H>  /  ALT  105<H>  /  AlkPhos  82  09-21      Urinalysis Basic - ( 21 Sep 2023 05:00 )    Color: x / Appearance: x / SG: x / pH: x  Gluc: 106 mg/dL / Ketone: x  / Bili: x / Urobili: x   Blood: x / Protein: x / Nitrite: x   Leuk Esterase: x / RBC: x / WBC x   Sq Epi: x / Non Sq Epi: x / Bacteria: x        MICROBIOLOGY:    RADIOLOGY & ADDITIONAL STUDIES:

## 2023-09-21 NOTE — PROGRESS NOTE ADULT - PROBLEM SELECTOR PLAN 6
F: PO  E: replete PRN  Diet: regular  GI PPx: none  DVT PPx: hep subQ  Code: FULL  Dispo: [facility] F: ns 100ml/hr for 5 hrs  E: replete PRN  Diet: regular  GI PPx: none  DVT PPx: hep subQ  Code: FULL  Dispo: [facility]

## 2023-09-21 NOTE — PATIENT PROFILE ADULT - FALL HARM RISK - HARM RISK INTERVENTIONS

## 2023-09-21 NOTE — PROGRESS NOTE ADULT - PROBLEM SELECTOR PLAN 2
- Started thiamine, folic acid, MV - c/w thiamine 100 mg, folic acid 1 mg , MV Pt states he drinks multiple drinks daily. MCV normocytic    - c/w thiamine 100 mg, folic acid 1 mg , MV

## 2023-09-22 LAB
ALBUMIN SERPL ELPH-MCNC: 3.9 G/DL — SIGNIFICANT CHANGE UP (ref 3.3–5)
ALP SERPL-CCNC: 82 U/L — SIGNIFICANT CHANGE UP (ref 40–120)
ALT FLD-CCNC: 100 U/L — HIGH (ref 10–45)
ANION GAP SERPL CALC-SCNC: 10 MMOL/L — SIGNIFICANT CHANGE UP (ref 5–17)
AST SERPL-CCNC: 130 U/L — HIGH (ref 10–40)
BASOPHILS # BLD AUTO: 0.03 K/UL — SIGNIFICANT CHANGE UP (ref 0–0.2)
BASOPHILS NFR BLD AUTO: 0.7 % — SIGNIFICANT CHANGE UP (ref 0–2)
BILIRUB SERPL-MCNC: 1.3 MG/DL — HIGH (ref 0.2–1.2)
BUN SERPL-MCNC: 13 MG/DL — SIGNIFICANT CHANGE UP (ref 7–23)
CALCIUM SERPL-MCNC: 9.1 MG/DL — SIGNIFICANT CHANGE UP (ref 8.4–10.5)
CHLORIDE SERPL-SCNC: 101 MMOL/L — SIGNIFICANT CHANGE UP (ref 96–108)
CO2 SERPL-SCNC: 23 MMOL/L — SIGNIFICANT CHANGE UP (ref 22–31)
CREAT SERPL-MCNC: 0.83 MG/DL — SIGNIFICANT CHANGE UP (ref 0.5–1.3)
EGFR: 115 ML/MIN/1.73M2 — SIGNIFICANT CHANGE UP
EOSINOPHIL # BLD AUTO: 0.38 K/UL — SIGNIFICANT CHANGE UP (ref 0–0.5)
EOSINOPHIL NFR BLD AUTO: 8.8 % — HIGH (ref 0–6)
GLUCOSE SERPL-MCNC: 89 MG/DL — SIGNIFICANT CHANGE UP (ref 70–99)
HAV IGM SER-ACNC: SIGNIFICANT CHANGE UP
HBV CORE AB SER-ACNC: SIGNIFICANT CHANGE UP
HBV CORE IGM SER-ACNC: SIGNIFICANT CHANGE UP
HBV SURFACE AB SER-ACNC: REACTIVE
HBV SURFACE AG SER-ACNC: SIGNIFICANT CHANGE UP
HCT VFR BLD CALC: 41.3 % — SIGNIFICANT CHANGE UP (ref 39–50)
HCV AB S/CO SERPL IA: 0.05 S/CO — SIGNIFICANT CHANGE UP
HCV AB SERPL-IMP: SIGNIFICANT CHANGE UP
HGB BLD-MCNC: 14.1 G/DL — SIGNIFICANT CHANGE UP (ref 13–17)
IMM GRANULOCYTES NFR BLD AUTO: 0.2 % — SIGNIFICANT CHANGE UP (ref 0–0.9)
LYMPHOCYTES # BLD AUTO: 1.47 K/UL — SIGNIFICANT CHANGE UP (ref 1–3.3)
LYMPHOCYTES # BLD AUTO: 34.1 % — SIGNIFICANT CHANGE UP (ref 13–44)
MAGNESIUM SERPL-MCNC: 1.9 MG/DL — SIGNIFICANT CHANGE UP (ref 1.6–2.6)
MCHC RBC-ENTMCNC: 29.8 PG — SIGNIFICANT CHANGE UP (ref 27–34)
MCHC RBC-ENTMCNC: 34.1 GM/DL — SIGNIFICANT CHANGE UP (ref 32–36)
MCV RBC AUTO: 87.3 FL — SIGNIFICANT CHANGE UP (ref 80–100)
MONOCYTES # BLD AUTO: 0.35 K/UL — SIGNIFICANT CHANGE UP (ref 0–0.9)
MONOCYTES NFR BLD AUTO: 8.1 % — SIGNIFICANT CHANGE UP (ref 2–14)
NEUTROPHILS # BLD AUTO: 2.07 K/UL — SIGNIFICANT CHANGE UP (ref 1.8–7.4)
NEUTROPHILS NFR BLD AUTO: 48.1 % — SIGNIFICANT CHANGE UP (ref 43–77)
NRBC # BLD: 0 /100 WBCS — SIGNIFICANT CHANGE UP (ref 0–0)
PHOSPHATE SERPL-MCNC: 2.4 MG/DL — LOW (ref 2.5–4.5)
PLATELET # BLD AUTO: 161 K/UL — SIGNIFICANT CHANGE UP (ref 150–400)
POTASSIUM SERPL-MCNC: 3.7 MMOL/L — SIGNIFICANT CHANGE UP (ref 3.5–5.3)
POTASSIUM SERPL-SCNC: 3.7 MMOL/L — SIGNIFICANT CHANGE UP (ref 3.5–5.3)
PROT SERPL-MCNC: 7.2 G/DL — SIGNIFICANT CHANGE UP (ref 6–8.3)
RBC # BLD: 4.73 M/UL — SIGNIFICANT CHANGE UP (ref 4.2–5.8)
RBC # FLD: 13.4 % — SIGNIFICANT CHANGE UP (ref 10.3–14.5)
SODIUM SERPL-SCNC: 134 MMOL/L — LOW (ref 135–145)
WBC # BLD: 4.31 K/UL — SIGNIFICANT CHANGE UP (ref 3.8–10.5)
WBC # FLD AUTO: 4.31 K/UL — SIGNIFICANT CHANGE UP (ref 3.8–10.5)

## 2023-09-22 PROCEDURE — 99233 SBSQ HOSP IP/OBS HIGH 50: CPT | Mod: GC

## 2023-09-22 RX ORDER — SODIUM,POTASSIUM PHOSPHATES 278-250MG
2 POWDER IN PACKET (EA) ORAL ONCE
Refills: 0 | Status: COMPLETED | OUTPATIENT
Start: 2023-09-22 | End: 2023-09-22

## 2023-09-22 RX ADMIN — Medication 75 MILLIGRAM(S): at 06:45

## 2023-09-22 RX ADMIN — Medication 2 PACKET(S): at 07:58

## 2023-09-22 RX ADMIN — HEPARIN SODIUM 5000 UNIT(S): 5000 INJECTION INTRAVENOUS; SUBCUTANEOUS at 06:45

## 2023-09-22 RX ADMIN — Medication 1 TABLET(S): at 13:37

## 2023-09-22 RX ADMIN — Medication 1 MILLIGRAM(S): at 13:37

## 2023-09-22 RX ADMIN — Medication 50 MILLIGRAM(S): at 14:05

## 2023-09-22 RX ADMIN — Medication 50 MILLIGRAM(S): at 22:56

## 2023-09-22 RX ADMIN — Medication 100 MILLIGRAM(S): at 13:38

## 2023-09-22 NOTE — PROGRESS NOTE ADULT - SUBJECTIVE AND OBJECTIVE BOX
*****INCOMPLETE NOTE*****    INTERVAL HPI/OVERNIGHT EVENTS:    SUBJECTIVE: Patient seen and examined at bedside, resting comfortably in bed, and does not appear to be in any acute distress. Patient states  Patient denies any recent fevers, chills, nausea, vomiting, headache, acute sob, chest pain, abdominal pain, genitourinary sx, extremity pain or swelling.     ANTIBIOTICS/RELEVANT:    MEDICATIONS  (STANDING):  chlordiazePOXIDE 75 milliGRAM(s) Oral every 8 hours  folic acid 1 milliGRAM(s) Oral daily  heparin   Injectable 5000 Unit(s) SubCutaneous every 8 hours  multivitamin 1 Tablet(s) Oral daily  sodium chloride 0.9%. 1000 milliLiter(s) (100 mL/Hr) IV Continuous <Continuous>  thiamine 100 milliGRAM(s) Oral daily    MEDICATIONS  (PRN):  LORazepam   Injectable 1 milliGRAM(s) IV Push every 1 hour PRN CIWA-Ar score 8 or greater  melatonin 3 milliGRAM(s) Oral at bedtime PRN Insomnia      Vital Signs Last 24 Hrs  T(C): 36.9 (21 Sep 2023 20:34), Max: 36.9 (21 Sep 2023 20:34)  T(F): 98.4 (21 Sep 2023 20:34), Max: 98.4 (21 Sep 2023 20:34)  HR: 82 (21 Sep 2023 20:34) (81 - 94)  BP: 137/86 (21 Sep 2023 20:34) (124/86 - 154/93)  BP(mean): --  RR: 16 (21 Sep 2023 20:34) (16 - 20)  SpO2: 96% (21 Sep 2023 20:34) (96% - 98%)    Parameters below as of 21 Sep 2023 20:34  Patient On (Oxygen Delivery Method): room air        PHYSICAL EXAM:  General: in no acute distress  Eyes: EOMI intact bilaterally. Anicteric sclerae, moist conjunctivae  HENT: Moist mucous membranes  Neck: Trachea midline, supple  Lungs: CTA B/L. No wheezes, rales, or rhonchi  Cardiovascular: RRR. No murmurs, rubs, or gallops  Abdomen: Soft, non-tender non-distended; No rebound or guarding  Extremities: WWP, No clubbing, cyanosis or edema  Neurological: Alert and oriented  Skin: Warm and dry. No obvious rash     LABS:                        13.4   4.20  )-----------( 207      ( 21 Sep 2023 05:00 )             39.5     09-21    137  |  97  |  15  ----------------------------<  106<H>  3.0<L>   |  26  |  0.79    Ca    8.8      21 Sep 2023 05:00  Phos  3.3     09-21  Mg     2.0     09-21    TPro  7.1  /  Alb  3.9  /  TBili  2.2<H>  /  DBili  0.4<H>  /  AST  126<H>  /  ALT  105<H>  /  AlkPhos  82  09-21      Urinalysis Basic - ( 21 Sep 2023 05:00 )    Color: x / Appearance: x / SG: x / pH: x  Gluc: 106 mg/dL / Ketone: x  / Bili: x / Urobili: x   Blood: x / Protein: x / Nitrite: x   Leuk Esterase: x / RBC: x / WBC x   Sq Epi: x / Non Sq Epi: x / Bacteria: x        MICROBIOLOGY:    RADIOLOGY & ADDITIONAL STUDIES: *****INCOMPLETE NOTE*****    INTERVAL HPI/OVERNIGHT EVENTS: FELICE o/n.   SUBJECTIVE: Patient seen and examined at bedside, resting comfortably in bed, and does not appear to be in any acute distress. Patient states that he is feeling much better compared to 2 days prior. Expresses concern with d/c of his mother who is also a [facility] patient on the same floor. Pt wishes to be with her when she is d/c'ed.   Patient denies any recent fevers, chills, nausea, vomiting, headache, acute sob, chest pain, abdominal pain, genitourinary sx, extremity pain or swelling.     ANTIBIOTICS/RELEVANT:    MEDICATIONS  (STANDING):  chlordiazePOXIDE 75 milliGRAM(s) Oral every 8 hours  folic acid 1 milliGRAM(s) Oral daily  heparin   Injectable 5000 Unit(s) SubCutaneous every 8 hours  multivitamin 1 Tablet(s) Oral daily  sodium chloride 0.9%. 1000 milliLiter(s) (100 mL/Hr) IV Continuous <Continuous>  thiamine 100 milliGRAM(s) Oral daily    MEDICATIONS  (PRN):  LORazepam   Injectable 1 milliGRAM(s) IV Push every 1 hour PRN CIWA-Ar score 8 or greater  melatonin 3 milliGRAM(s) Oral at bedtime PRN Insomnia      Vital Signs Last 24 Hrs  T(C): 36.9 (21 Sep 2023 20:34), Max: 36.9 (21 Sep 2023 20:34)  T(F): 98.4 (21 Sep 2023 20:34), Max: 98.4 (21 Sep 2023 20:34)  HR: 82 (21 Sep 2023 20:34) (81 - 94)  BP: 137/86 (21 Sep 2023 20:34) (124/86 - 154/93)  BP(mean): --  RR: 16 (21 Sep 2023 20:34) (16 - 20)  SpO2: 96% (21 Sep 2023 20:34) (96% - 98%)    Parameters below as of 21 Sep 2023 20:34  Patient On (Oxygen Delivery Method): room air        PHYSICAL EXAM:  General: in no acute distress  Eyes: EOMI intact bilaterally. Anicteric sclerae, moist conjunctivae  HENT: Moist mucous membranes  Neck: Trachea midline, supple  Lungs: CTA B/L. No wheezes, rales, or rhonchi  Cardiovascular: RRR. No murmurs, rubs, or gallops  Abdomen: Soft, non-tender non-distended; No rebound or guarding  Extremities: WWP, No clubbing, cyanosis or edema  Neurological: Alert and oriented  Skin: Warm and dry. No obvious rash     LABS:                        13.4   4.20  )-----------( 207      ( 21 Sep 2023 05:00 )             39.5     09-21    137  |  97  |  15  ----------------------------<  106<H>  3.0<L>   |  26  |  0.79    Ca    8.8      21 Sep 2023 05:00  Phos  3.3     09-21  Mg     2.0     09-21    TPro  7.1  /  Alb  3.9  /  TBili  2.2<H>  /  DBili  0.4<H>  /  AST  126<H>  /  ALT  105<H>  /  AlkPhos  82  09-21      Urinalysis Basic - ( 21 Sep 2023 05:00 )    Color: x / Appearance: x / SG: x / pH: x  Gluc: 106 mg/dL / Ketone: x  / Bili: x / Urobili: x   Blood: x / Protein: x / Nitrite: x   Leuk Esterase: x / RBC: x / WBC x   Sq Epi: x / Non Sq Epi: x / Bacteria: x        MICROBIOLOGY:    RADIOLOGY & ADDITIONAL STUDIES: INTERVAL HPI/OVERNIGHT EVENTS: FELICE o/n.   SUBJECTIVE: Patient seen and examined at bedside, resting comfortably in bed, and does not appear to be in any acute distress. Patient states that he is feeling much better compared to 2 days prior. Expresses concern with d/c of his mother who is also a [facility] patient on the same floor. Pt wishes to be with her when she is d/c'ed.   Patient denies any recent fevers, chills, nausea, vomiting, headache, acute sob, chest pain, abdominal pain, genitourinary sx, extremity pain or swelling.     ANTIBIOTICS/RELEVANT:    MEDICATIONS  (STANDING):  chlordiazePOXIDE 75 milliGRAM(s) Oral every 8 hours  folic acid 1 milliGRAM(s) Oral daily  heparin   Injectable 5000 Unit(s) SubCutaneous every 8 hours  multivitamin 1 Tablet(s) Oral daily  sodium chloride 0.9%. 1000 milliLiter(s) (100 mL/Hr) IV Continuous <Continuous>  thiamine 100 milliGRAM(s) Oral daily    MEDICATIONS  (PRN):  LORazepam   Injectable 1 milliGRAM(s) IV Push every 1 hour PRN CIWA-Ar score 8 or greater  melatonin 3 milliGRAM(s) Oral at bedtime PRN Insomnia      Vital Signs Last 24 Hrs  T(C): 36.9 (21 Sep 2023 20:34), Max: 36.9 (21 Sep 2023 20:34)  T(F): 98.4 (21 Sep 2023 20:34), Max: 98.4 (21 Sep 2023 20:34)  HR: 82 (21 Sep 2023 20:34) (81 - 94)  BP: 137/86 (21 Sep 2023 20:34) (124/86 - 154/93)  BP(mean): --  RR: 16 (21 Sep 2023 20:34) (16 - 20)  SpO2: 96% (21 Sep 2023 20:34) (96% - 98%)    Parameters below as of 21 Sep 2023 20:34  Patient On (Oxygen Delivery Method): room air        PHYSICAL EXAM:  General: in no acute distress  Eyes: EOMI intact bilaterally. Anicteric sclerae, moist conjunctivae  HENT: Moist mucous membranes  Neck: Trachea midline, supple  Lungs: CTA B/L. No wheezes, rales, or rhonchi  Cardiovascular: RRR. No murmurs, rubs, or gallops  Abdomen: Soft, non-tender non-distended; No rebound or guarding  Extremities: WWP, No clubbing, cyanosis or edema  Neurological: Alert and oriented  Skin: Warm and dry. No obvious rash     LABS:                        13.4   4.20  )-----------( 207      ( 21 Sep 2023 05:00 )             39.5     09-21    137  |  97  |  15  ----------------------------<  106<H>  3.0<L>   |  26  |  0.79    Ca    8.8      21 Sep 2023 05:00  Phos  3.3     09-21  Mg     2.0     09-21    TPro  7.1  /  Alb  3.9  /  TBili  2.2<H>  /  DBili  0.4<H>  /  AST  126<H>  /  ALT  105<H>  /  AlkPhos  82  09-21      Urinalysis Basic - ( 21 Sep 2023 05:00 )    Color: x / Appearance: x / SG: x / pH: x  Gluc: 106 mg/dL / Ketone: x  / Bili: x / Urobili: x   Blood: x / Protein: x / Nitrite: x   Leuk Esterase: x / RBC: x / WBC x   Sq Epi: x / Non Sq Epi: x / Bacteria: x        MICROBIOLOGY:    RADIOLOGY & ADDITIONAL STUDIES: INTERVAL HPI/OVERNIGHT EVENTS: FELICE o/n.   SUBJECTIVE: Patient seen and examined at bedside, resting comfortably in bed, and does not appear to be in any acute distress. Patient states that he is feeling much better compared to 2 days prior. Expresses concern with d/c of his mother who is also a [facility] patient on the same floor. Pt wishes to be with her when she is d/c'ed.   Patient denies any recent fevers, chills, nausea, vomiting, headache, acute sob, chest pain, abdominal pain, genitourinary sx, extremity pain or swelling.     MEDICATIONS  (STANDING):  chlordiazePOXIDE 75 milliGRAM(s) Oral every 8 hours  folic acid 1 milliGRAM(s) Oral daily  heparin   Injectable 5000 Unit(s) SubCutaneous every 8 hours  multivitamin 1 Tablet(s) Oral daily  sodium chloride 0.9%. 1000 milliLiter(s) (100 mL/Hr) IV Continuous <Continuous>  thiamine 100 milliGRAM(s) Oral daily    MEDICATIONS  (PRN):  LORazepam   Injectable 1 milliGRAM(s) IV Push every 1 hour PRN CIWA-Ar score 8 or greater  melatonin 3 milliGRAM(s) Oral at bedtime PRN Insomnia      Vital Signs Last 24 Hrs  T(C): 36.9 (21 Sep 2023 20:34), Max: 36.9 (21 Sep 2023 20:34)  T(F): 98.4 (21 Sep 2023 20:34), Max: 98.4 (21 Sep 2023 20:34)  HR: 82 (21 Sep 2023 20:34) (81 - 94)  BP: 137/86 (21 Sep 2023 20:34) (124/86 - 154/93)  BP(mean): --  RR: 16 (21 Sep 2023 20:34) (16 - 20)  SpO2: 96% (21 Sep 2023 20:34) (96% - 98%)    Parameters below as of 21 Sep 2023 20:34  Patient On (Oxygen Delivery Method): room air        PHYSICAL EXAM:  General: in no acute distress  Eyes: EOMI intact bilaterally. Anicteric sclerae, pupils 6mm bilaterally  HENT: Moist mucous membranes  Neck: Trachea midline  Lungs: CTA B/L.  Cardiovascular: RRR  Abdomen: Soft, non-tender non-distended; No rebound or guarding  Extremities: tremors b/l hands on extension, WWP, no piloerection  Neurological: Alert and oriented  Skin: Warm and dry, no visible sweating    LABS:                        13.4   4.20  )-----------( 207      ( 21 Sep 2023 05:00 )             39.5     09-21    137  |  97  |  15  ----------------------------<  106<H>  3.0<L>   |  26  |  0.79    Ca    8.8      21 Sep 2023 05:00  Phos  3.3     09-21  Mg     2.0     09-21    TPro  7.1  /  Alb  3.9  /  TBili  2.2<H>  /  DBili  0.4<H>  /  AST  126<H>  /  ALT  105<H>  /  AlkPhos  82  09-21      Urinalysis Basic - ( 21 Sep 2023 05:00 )    Color: x / Appearance: x / SG: x / pH: x  Gluc: 106 mg/dL / Ketone: x  / Bili: x / Urobili: x   Blood: x / Protein: x / Nitrite: x   Leuk Esterase: x / RBC: x / WBC x   Sq Epi: x / Non Sq Epi: x / Bacteria: x        MICROBIOLOGY:    RADIOLOGY & ADDITIONAL STUDIES:

## 2023-09-22 NOTE — PROGRESS NOTE ADULT - PROBLEM SELECTOR PLAN 6
F: ns 100ml/hr for 5 hrs  E: replete PRN  Diet: regular  GI PPx: none  DVT PPx: hep subQ  Code: FULL  Dispo: [facility] F: s/p ns 100ml/hr for 5 hrs  E: replete PRN  Diet: regular  GI PPx: none  DVT PPx: none given  Code: FULL  Dispo: [facility]

## 2023-09-22 NOTE — PROGRESS NOTE ADULT - PROBLEM SELECTOR PLAN 1
last drink reportedly 9/19; clinically improved; cont. Librium taper, serial CIWAs, Ativan PRN; thiamine, folate, MVI; SBIRT SW [facility] for cessation resources, although Pt. plans to return to [place] for possible rehab

## 2023-09-22 NOTE — PROGRESS NOTE ADULT - NSPROGADDITIONALINFOA_GEN_ALL_CORE
DVT ppx: low risk, no need for A/C  Dispo: anticipate d/c this weekend, pending Librium taper  Pt. plans to return to [facility] to provide shelter resources in [facility] if needed
DVT ppx: HSQ  Dispo: home pending Librium taper  SBIRT SW eval

## 2023-09-22 NOTE — PROGRESS NOTE ADULT - PROBLEM SELECTOR PLAN 5
- K 3.0 this AM, down from 4.1 yesterday  - Repleted w/ KCl - K 3.7 this AM after repletion, up from 3.0 yesterday  - Replete as needed

## 2023-09-22 NOTE — PROGRESS NOTE ADULT - SUBJECTIVE AND OBJECTIVE BOX
Patient is a 37y old  Male who presents with a chief complaint of     INTERVAL HPI/OVERNIGHT EVENTS:    Pt. seen and examined earlier today  Pt. reports feeling much better  Pt. c/o mild/improved postural hand tremor, otherwise has no complaints  Pt. denies F/C, CP, SOB, N/V, HA, anxiety, sweating, hallucinations    Review of Systems: 12 point review of systems otherwise negative    MEDICATIONS  (STANDING):  chlordiazePOXIDE 50 milliGRAM(s) Oral every 8 hours  folic acid 1 milliGRAM(s) Oral daily  multivitamin 1 Tablet(s) Oral daily  sodium chloride 0.9%. 1000 milliLiter(s) (100 mL/Hr) IV Continuous <Continuous>  thiamine 100 milliGRAM(s) Oral daily    MEDICATIONS  (PRN):  LORazepam   Injectable 1 milliGRAM(s) IV Push every 1 hour PRN CIWA-Ar score 8 or greater  melatonin 3 milliGRAM(s) Oral at bedtime PRN Insomnia      Allergies    No Known Allergies    Intolerances          Vital Signs Last 24 Hrs  T(C): 36.6 (22 Sep 2023 09:34), Max: 36.9 (21 Sep 2023 20:34)  T(F): 97.9 (22 Sep 2023 09:34), Max: 98.4 (21 Sep 2023 20:34)  HR: 76 (22 Sep 2023 09:34) (73 - 91)  BP: 139/94 (22 Sep 2023 09:34) (124/86 - 154/93)  BP(mean): --  RR: 18 (22 Sep 2023 09:34) (16 - 20)  SpO2: 98% (22 Sep 2023 09:34) (95% - 98%)    Parameters below as of 22 Sep 2023 09:34  Patient On (Oxygen Delivery Method): room air      CAPILLARY BLOOD GLUCOSE            Physical Exam:  (earlier today)  Daily     Daily   General:  comfortable-appearing in NAD, calm and cooperative, sitting up in bed eating lunch  HEENT:  MMM, anicteric  CV:  RRR  Lungs:  CTA B/L  Abdomen:  soft NT ND  Extremities:  no edema B/L LE  Skin:  WWP, no visible sweat  Neuro:  AAOx3, minimal/subtle postural hand tremor (improved)  steady gait    LABS:                        14.1   4.31  )-----------( 161      ( 22 Sep 2023 07:00 )             41.3     09-22    134<L>  |  101  |  13  ----------------------------<  89  3.7   |  23  |  0.83    Ca    9.1      22 Sep 2023 07:00  Phos  2.4     09-22  Mg     1.9     09-22    TPro  7.2  /  Alb  3.9  /  TBili  1.3<H>  /  DBili  x   /  AST  130<H>  /  ALT  100<H>  /  AlkPhos  82  09-22      Urinalysis Basic - ( 22 Sep 2023 07:00 )    Color: x / Appearance: x / SG: x / pH: x  Gluc: 89 mg/dL / Ketone: x  / Bili: x / Urobili: x   Blood: x / Protein: x / Nitrite: x   Leuk Esterase: x / RBC: x / WBC x   Sq Epi: x / Non Sq Epi: x / Bacteria: x

## 2023-09-22 NOTE — PROGRESS NOTE ADULT - PROBLEM SELECTOR PLAN 4
Most likely in the setting of alcohol abuse  - See above  - Avoid hepatotoxic meds Most likely in the setting of alcohol abuse. AST at 130, ALT at 100.    - See above  - Avoid hepatotoxic meds  - f/u hepatitis panel

## 2023-09-22 NOTE — PROGRESS NOTE ADULT - PROBLEM SELECTOR PLAN 1
Patient with history of previous alcohol withdrawal. No seizures in the past.    - Continue with CIWA protocol   - Started librium 75 mg qd ---> taper daily  - Ativan 1 mg q hourly PRN for CIWA > 8 Patient with history of previous alcohol withdrawal. No seizures in the past.  - Continue with CIWA protocol   -  librium tapered to 50 mg TID starting at 1400 hours  - Ativan 1 mg q hourly PRN for CIWA > 8

## 2023-09-22 NOTE — DISCHARGE NOTE PROVIDER - CARE PROVIDER_API CALL
Neema Fregoso)  Internal Medicine  [address], [address] [place] [address]  Phone: [phone]  Fax: [phone]  Follow Up Time:

## 2023-09-22 NOTE — DISCHARGE NOTE PROVIDER - HOSPITAL COURSE
#Discharge: do not delete    38 yo M with history of alcohol abuse with previous withdrawals but never experienced seizures/intubations, anxiety presenting with alcohol withdrawal. Pt was found to have a CIWA score of 17 for anxiety/tremors/diaphoresis. He was given valium 5mg and 10mg in the ED. He was started on librium 75mg q8 and tapered over four days. Pt required no PRN ativan. Pt clinically stable and ready for discharge to inpatient alcohol rehab.      Problem List/Main Diagnoses:   #Alcohol withdrawal.   ·  Plan: Patient with history of previous alcohol withdrawal. Pt was given valium 5mg and 10mg in the ED. Started on librium 75mg q8 and tapered to 50mg q24. Pt required no ativan during stay. Pt clinically improved and stable for discharge to inpatient alcohol rehab.    - f/u outpatient with pcp in [place]    #Alcohol abuse.   ·  Plan: Pt states he drinks multiple drinks daily. MCV normocytic. Given thiamine 100 mg, folic acid 1 mg during admission. Pt will be encouraged to continue multivitamin use at home.    #Hyperbilirubinemia  ·  Plan: Abd exam benign. Bilirubin downtrending during admission. Pt will follow up with PCP outpatient.    - f/u with pcp outpatient    #Elevated LFTs.   ·  Plan: Most likely in the setting of alcohol abuse. AST at 130, ALT at 100. Hepatitis panel wnl. Likely in the setting of alcohol abuse. Pt will follow up outpatient with pcp.    New medications/therapies: none  New lines/hardware: none  Labs to be followed outpatient: bilirubin, ast, alt  Exam to be followed outpatient: none    Discharge plan: discharge to inpatient alcohol rehabilitation    Physical Exam Upon Discharge:  General: in no acute distress  Eyes: EOMI intact bilaterally. Anicteric sclerae, pupils 6mm bilaterally  HENT: Moist mucous membranes  Neck: Trachea midline  Lungs: CTA B/L.  Cardiovascular: RRR  Abdomen: Soft, non-tender non-distended; No rebound or guarding  Extremities: tremors b/l hands on extension, WWP, no piloerection  Neurological: Alert and oriented  Skin: Warm and dry, no visible sweating #Discharge: do not delete    36 yo M with history of alcohol abuse with previous withdrawals but never experienced seizures/intubations, anxiety presenting with alcohol withdrawal. Pt was found to have a CIWA score of 17 for anxiety/tremors/diaphoresis. He was given valium 5mg and 10mg in the ED. He was started on librium 75mg q8 and tapered over four days. Pt required no PRN ativan. Pt will complete taper outpatient. Pt will complete librium 25mg TID today, BID 9/24, and qD on 9/25. Pt clinically stable and ready for discharge to inpatient alcohol rehab.      Problem List/Main Diagnoses:   #Alcohol withdrawal.   ·  Plan: Patient with history of previous alcohol withdrawal. Pt was given valium 5mg and 10mg in the ED. Started on librium 75mg q8 and tapered to 25mg TID. Pt will complete librium 25mg TID today, BID 9/24, and qD on 9/25 Pt required no ativan during stay. Pt clinically improved and stable for discharge to home. Pt will follow up    - f/u outpatient with pcp in [place]    #Alcohol abuse.   ·  Plan: Pt states he drinks multiple drinks daily. MCV normocytic. Given thiamine 100 mg, folic acid 1 mg during admission. Pt will be encouraged to continue multivitamin use at home.    #Hyperbilirubinemia  ·  Plan: Abd exam benign. Bilirubin downtrending during admission. Pt will follow up with PCP outpatient.    - f/u with pcp outpatient    #Elevated LFTs.   ·  Plan: Most likely in the setting of alcohol abuse. AST at 130, ALT at 100. Hepatitis panel wnl. Likely in the setting of alcohol abuse. Pt will follow up outpatient with pcp.    New medications/therapies: none  New lines/hardware: none  Labs to be followed outpatient: bilirubin, ast, alt  Exam to be followed outpatient: none    Discharge plan: discharge to inpatient alcohol rehabilitation    Physical Exam Upon Discharge:  General: in no acute distress  Eyes: EOMI intact bilaterally. Anicteric sclerae, pupils 6mm bilaterally  HENT: Moist mucous membranes  Neck: Trachea midline  Lungs: CTA B/L.  Cardiovascular: RRR  Abdomen: Soft, non-tender non-distended; No rebound or guarding  Extremities: tremors b/l hands on extension, WWP, no piloerection  Neurological: Alert and oriented  Skin: Warm and dry, no visible sweating #Discharge: do not delete    36 yo M with history of alcohol abuse with previous withdrawals but never experienced seizures/intubations, anxiety presenting with alcohol withdrawal. Pt was found to have a CIWA score of 17 for anxiety/tremors/diaphoresis. He was given valium 5mg and 10mg in the ED. He was started on librium 75mg q8 and tapered over four days. Pt required no PRN ativan. Pt will complete taper outpatient. Pt will complete librium 25mg TID today, BID 9/24, and qD on 9/25. Pt clinically stable and ready for discharge to inpatient alcohol rehab.      Problem List/Main Diagnoses:   #Alcohol withdrawal.   ·  Plan: Patient with history of previous alcohol withdrawal. Pt was given valium 5mg and 10mg in the ED. Started on librium 75mg q8 and tapered to 25mg TID. Pt will complete librium 25mg TID today, BID 9/24, and qD on 9/25 Pt required no ativan during stay. Pt clinically improved and stable for discharge to home. Pt will follow up with pcp outpatient.    - f/u outpatient with pcp in [place]    #Alcohol abuse.   ·  Plan: Pt states he drinks multiple drinks daily. MCV normocytic. Given thiamine 100 mg, folic acid 1 mg during admission. Pt will be encouraged to continue multivitamin use at home.    #Hyperbilirubinemia  ·  Plan: Abd exam benign. Bilirubin downtrending during admission. Pt will follow up with PCP outpatient.    - f/u with pcp outpatient    #Elevated LFTs.   ·  Plan: Most likely in the setting of alcohol abuse. AST at 130, ALT at 100. Hepatitis panel wnl. Likely in the setting of alcohol abuse. Pt will follow up outpatient with pcp.    - f/u with pcp out patient  - right upper quadrant ultra sound    New medications/therapies: none  New lines/hardware: none  Labs to be followed outpatient: bilirubin, ast, alt  Exam to be followed outpatient: RUQUS    Discharge plan: discharge to inpatient alcohol rehabilitation    Physical Exam Upon Discharge:  General: in no acute distress  Eyes: EOMI intact bilaterally. Anicteric sclerae, pupils 6mm bilaterally  HENT: Moist mucous membranes  Neck: Trachea midline  Lungs: CTA B/L.  Cardiovascular: RRR  Abdomen: Soft, non-tender non-distended; No rebound or guarding  Extremities: mild tremors b/l hands on extension, WWP, no piloerection  Neurological: Alert and oriented  Skin: Warm and dry, no visible sweating #Discharge: do not delete    38 yo M with history of alcohol abuse with previous withdrawals but never experienced seizures/intubations, anxiety presenting with alcohol withdrawal. Pt was found to have a CIWA score of 17 for anxiety/tremors/diaphoresis. He was given valium 5mg and 10mg in the ED. He was started on librium 75mg q8 and tapered over four days. Pt required no PRN ativan. Pt will complete taper outpatient. Pt will complete librium 25mg TID today, BID 9/24, and qD on 9/25. Pt clinically stable and ready for discharge to inpatient alcohol rehab.      Problem List/Main Diagnoses:   #Alcohol withdrawal.   ·  Plan: Patient with history of previous alcohol withdrawal. Pt was given valium 5mg and 10mg in the ED. Started on librium 75mg q8 and tapered to 25mg TID. Pt will complete librium 25mg TID today, BID 9/24, and qD on 9/25 Pt required no ativan during stay. Pt clinically improved and stable for discharge to home. Pt will follow up with pcp outpatient.    - f/u outpatient with pcp in [place]    #Alcohol abuse.   ·  Plan: Pt states he drinks multiple drinks daily. MCV normocytic. Given thiamine 100 mg, folic acid 1 mg during admission. Pt will be encouraged to continue multivitamin use at home.    #Hyperbilirubinemia  ·  Plan: Abd exam benign. Bilirubin downtrending during admission. Pt will follow up with PCP outpatient.    - f/u with pcp outpatient    #Elevated LFTs.   ·  Plan: Most likely in the setting of alcohol abuse. AST at 130, ALT at 100. Hepatitis panel wnl. Likely in the setting of alcohol abuse. Pt will follow up outpatient with pcp.    - f/u with pcp out patient  - right upper quadrant ultra sound    New medications/therapies: Librium 25mg po taper. Please take 1 pill at 3pm, and another pill at 10pm on 9/23. Another pill at 10am and one at 10PM on 9/24. And finally on 9/25 take one pill in at 10AM.  New lines/hardware: none  Labs to be followed outpatient: bilirubin, ast, alt  Exam to be followed outpatient: RUQUS    Discharge plan: discharge to inpatient alcohol rehabilitation    Physical Exam Upon Discharge:  General: in no acute distress  Eyes: EOMI intact bilaterally. Anicteric sclerae, pupils 6mm bilaterally  HENT: Moist mucous membranes  Neck: Trachea midline  Lungs: CTA B/L.  Cardiovascular: RRR  Abdomen: Soft, non-tender non-distended; No rebound or guarding  Extremities: mild tremors b/l hands on extension, WWP, no piloerection  Neurological: Alert and oriented  Skin: Warm and dry, no visible sweating

## 2023-09-22 NOTE — PROGRESS NOTE ADULT - PROBLEM SELECTOR PLAN 2
Pt states he drinks multiple drinks daily. MCV normocytic    - c/w thiamine 100 mg, folic acid 1 mg , MV Pt states he drinks multiple drinks daily. MCV normocytic  - c/w thiamine 100 mg, folic acid 1 mg , MV

## 2023-09-22 NOTE — DISCHARGE NOTE PROVIDER - NSDCCPCAREPLAN_GEN_ALL_CORE_FT
PRINCIPAL DISCHARGE DIAGNOSIS  Diagnosis: Alcohol withdrawal  Assessment and Plan of Treatment: Alcohol withdrawal syndrome is a group of symptoms that can develop when a person who drinks heavily and regularly stops drinking or drinks less. Alcohol withdrawal syndrome can be mild or severe, and it may even be life-threatening. Alcohol withdrawal syndrome usually affects people who have alcohol use disorder, which may also be called alcoholism. Alcohol use disorder is when a person is unable to control their alcohol use. This usually disrupts daily life. With your withdrawals, you had some mild sweating, tremors and anxiety. You received librium during you stay to help you through your withdrawal. You will continue your librium taper at home. You will be prescribed librium 25mg, five capsules, that you will begin taking today. If you continue to have withdrawal symptoms such as sweating, hallucinations, tremors, anxiety, or a pins and needle sensation on your skin please come back to the ED immediately. Please follow up out patient with your PCP.  Medications  Librium 25mg  Please take one pill today 9/23 at 3PM, and another pill at 11PM. Please take another pill on 9/24 at 10AM and another at 10PM. Finally, on 9/25 please take one last pill in the morning at 10AM and then stop.

## 2023-09-22 NOTE — PROGRESS NOTE ADULT - PROBLEM SELECTOR PLAN 3
Abd exam benign.   - AST//105 this AM, down from 181/131  - Direct bili 0.4, indirect 1.8  - Likely 2/2 alcohol use, AST/ALT ratio > 1 w/ mixed hyperbilirubinemia and h/o alcohol abuse and withdrawals  - No indication for RUQ at this moment, consider in setting of rising abnormal LFTs or exam change Abd exam benign.   - AST//100 this AM [id number] yesterday  - Total bili 1.3 this AM  - Likely 2/2 alcohol use, AST/ALT ratio > 1 w/ mixed hyperbilirubinemia and h/o alcohol abuse and withdrawals  - No indication for RUQ at this moment, consider in setting of rising abnormal LFTs or exam change

## 2023-09-22 NOTE — SBIRT NOTE ADULT - NSSBIRTALCPASSREFTXDET_GEN_A_CORE
Patient reported regular alcohol use. Patient reported he would like to stop drinking alcohol. Patient not interested in any active referrals at this time.  provided patient with substance use resources, patient receptive.

## 2023-09-22 NOTE — PROGRESS NOTE ADULT - TIME BILLING
coordination of care  direct patient encounter  reviewed labs and images  d/c planning  d/w Medicine residents
coordination of care  direct patient encounter  reviewed labs and images  d/c planning  d/w Medicine residents

## 2023-09-23 VITALS
RESPIRATION RATE: 18 BRPM | DIASTOLIC BLOOD PRESSURE: 98 MMHG | SYSTOLIC BLOOD PRESSURE: 147 MMHG | TEMPERATURE: 98 F | OXYGEN SATURATION: 97 % | HEART RATE: 74 BPM

## 2023-09-23 LAB
ALBUMIN SERPL ELPH-MCNC: 4.2 G/DL — SIGNIFICANT CHANGE UP (ref 3.3–5)
ALP SERPL-CCNC: 102 U/L — SIGNIFICANT CHANGE UP (ref 40–120)
ALT FLD-CCNC: 172 U/L — HIGH (ref 10–45)
ANION GAP SERPL CALC-SCNC: 11 MMOL/L — SIGNIFICANT CHANGE UP (ref 5–17)
AST SERPL-CCNC: 255 U/L — HIGH (ref 10–40)
BILIRUB DIRECT SERPL-MCNC: 0.2 MG/DL — SIGNIFICANT CHANGE UP (ref 0–0.3)
BILIRUB INDIRECT FLD-MCNC: 0.5 MG/DL — SIGNIFICANT CHANGE UP (ref 0.2–1)
BILIRUB SERPL-MCNC: 0.7 MG/DL — SIGNIFICANT CHANGE UP (ref 0.2–1.2)
BUN SERPL-MCNC: 16 MG/DL — SIGNIFICANT CHANGE UP (ref 7–23)
CALCIUM SERPL-MCNC: 9.5 MG/DL — SIGNIFICANT CHANGE UP (ref 8.4–10.5)
CHLORIDE SERPL-SCNC: 102 MMOL/L — SIGNIFICANT CHANGE UP (ref 96–108)
CO2 SERPL-SCNC: 21 MMOL/L — LOW (ref 22–31)
CREAT SERPL-MCNC: 0.72 MG/DL — SIGNIFICANT CHANGE UP (ref 0.5–1.3)
EGFR: 120 ML/MIN/1.73M2 — SIGNIFICANT CHANGE UP
GLUCOSE SERPL-MCNC: 95 MG/DL — SIGNIFICANT CHANGE UP (ref 70–99)
HCT VFR BLD CALC: 41.8 % — SIGNIFICANT CHANGE UP (ref 39–50)
HGB BLD-MCNC: 14 G/DL — SIGNIFICANT CHANGE UP (ref 13–17)
MAGNESIUM SERPL-MCNC: 1.7 MG/DL — SIGNIFICANT CHANGE UP (ref 1.6–2.6)
MCHC RBC-ENTMCNC: 30.2 PG — SIGNIFICANT CHANGE UP (ref 27–34)
MCHC RBC-ENTMCNC: 33.5 GM/DL — SIGNIFICANT CHANGE UP (ref 32–36)
MCV RBC AUTO: 90.1 FL — SIGNIFICANT CHANGE UP (ref 80–100)
NRBC # BLD: 0 /100 WBCS — SIGNIFICANT CHANGE UP (ref 0–0)
PHOSPHATE SERPL-MCNC: 2.2 MG/DL — LOW (ref 2.5–4.5)
PLATELET # BLD AUTO: 143 K/UL — LOW (ref 150–400)
POTASSIUM SERPL-MCNC: 3.8 MMOL/L — SIGNIFICANT CHANGE UP (ref 3.5–5.3)
POTASSIUM SERPL-SCNC: 3.8 MMOL/L — SIGNIFICANT CHANGE UP (ref 3.5–5.3)
PROT SERPL-MCNC: 7.7 G/DL — SIGNIFICANT CHANGE UP (ref 6–8.3)
RBC # BLD: 4.64 M/UL — SIGNIFICANT CHANGE UP (ref 4.2–5.8)
RBC # FLD: 13.9 % — SIGNIFICANT CHANGE UP (ref 10.3–14.5)
SODIUM SERPL-SCNC: 134 MMOL/L — LOW (ref 135–145)
WBC # BLD: 4.87 K/UL — SIGNIFICANT CHANGE UP (ref 3.8–10.5)
WBC # FLD AUTO: 4.87 K/UL — SIGNIFICANT CHANGE UP (ref 3.8–10.5)

## 2023-09-23 PROCEDURE — 99239 HOSP IP/OBS DSCHRG MGMT >30: CPT

## 2023-09-23 RX ORDER — MAGNESIUM SULFATE 500 MG/ML
2 VIAL (ML) INJECTION ONCE
Refills: 0 | Status: COMPLETED | OUTPATIENT
Start: 2023-09-23 | End: 2023-09-23

## 2023-09-23 RX ADMIN — Medication 50 MILLIGRAM(S): at 06:31

## 2023-09-23 RX ADMIN — Medication 1 MILLIGRAM(S): at 11:33

## 2023-09-23 RX ADMIN — Medication 25 GRAM(S): at 11:32

## 2023-09-23 RX ADMIN — Medication 100 MILLIGRAM(S): at 11:33

## 2023-09-23 RX ADMIN — Medication 1 TABLET(S): at 11:33

## 2023-09-23 RX ADMIN — Medication 62.5 MILLIMOLE(S): at 11:32

## 2023-09-23 RX ADMIN — Medication 25 MILLIGRAM(S): at 14:18

## 2023-09-23 NOTE — DISCHARGE NOTE NURSING/CASE MANAGEMENT/SOCIAL WORK - PATIENT PORTAL LINK FT
You can access the FollowMyHealth Patient Portal offered by [facility] by registering at the following website: [email or web address]. By joining [employer]’s FollowMyHealth portal, you will also be able to view your health information using other applications (apps) compatible with our system.

## 2023-09-23 NOTE — PROGRESS NOTE ADULT - PROBLEM SELECTOR PLAN 1
Patient with history of previous alcohol withdrawal. No seizures in the past.  - Continue with CIWA protocol   -  librium tapered to 50 mg TID starting at 1400 hours  - Ativan 1 mg q hourly PRN for CIWA > 8 Patient with history of previous alcohol withdrawal. No seizures in the past.  - Continue with CIWA protocol   -  librium  50 mg TID tapered to BID  - Ativan 1 mg q hourly PRN for CIWA > 8 Patient with history of previous alcohol withdrawal. No seizures in the past.  - Continue with CIWA protocol   - librium 50 mg TID tapered to BID  - Ativan 1 mg q hourly PRN for CIWA > 8 Patient with history of previous alcohol withdrawal. No seizures in the past.  - Continue with CIWA protocol   - librium 50 mg TID tapered to librium 25mg TID  - Ativan 1 mg q hourly PRN for CIWA > 8

## 2023-09-23 NOTE — PROGRESS NOTE ADULT - PROBLEM SELECTOR PLAN 3
Abd exam benign.   - AST//100 this AM [id number] yesterday  - Total bili 1.3 this AM  - Likely 2/2 alcohol use, AST/ALT ratio > 1 w/ mixed hyperbilirubinemia and h/o alcohol abuse and withdrawals  - No indication for RUQ at this moment, consider in setting of rising abnormal LFTs or exam change RESOLVED  Abd exam benign.   - AST/ALT  255/172 this AM from 130/100 yesterday  - Total bili .7 this AM  - Likely 2/2 alcohol use, AST/ALT ratio > 1 w/ mixed hyperbilirubinemia and h/o alcohol abuse and withdrawals  - No indication for RUQ at this moment, consider in setting of rising abnormal LFTs or exam change

## 2023-09-23 NOTE — PROGRESS NOTE ADULT - ASSESSMENT
36 yo M with history of alcohol abuse with previous withdrawals but never experienced seizures/intubations, anxiety presenting with alcohol withdrawal.
36 y/o M w/
38 yo M with history of alcohol abuse with previous withdrawals but never experienced seizures/intubations, anxiety presenting with alcohol withdrawal.
36 y/o M w/
38 yo M with history of alcohol abuse with previous withdrawals but never experienced seizures/intubations, anxiety presenting with alcohol withdrawal.

## 2023-09-23 NOTE — PROGRESS NOTE ADULT - PROBLEM SELECTOR PLAN 2
Pt states he drinks multiple drinks daily. MCV normocytic  - c/w thiamine 100 mg, folic acid 1 mg , MV

## 2023-09-23 NOTE — PROGRESS NOTE ADULT - SUBJECTIVE AND OBJECTIVE BOX
*****INCOMPLETE NOTE*****    INTERVAL HPI/OVERNIGHT EVENTS:    SUBJECTIVE: Patient seen and examined at bedside, resting comfortably in bed, and does not appear to be in any acute distress. Patient states  Patient denies any recent fevers, chills, nausea, vomiting, headache, acute sob, chest pain, abdominal pain, genitourinary sx, extremity pain or swelling.     ANTIBIOTICS/RELEVANT:    MEDICATIONS  (STANDING):  chlordiazePOXIDE 50 milliGRAM(s) Oral every 8 hours  folic acid 1 milliGRAM(s) Oral daily  multivitamin 1 Tablet(s) Oral daily  sodium chloride 0.9%. 1000 milliLiter(s) (100 mL/Hr) IV Continuous <Continuous>  thiamine 100 milliGRAM(s) Oral daily    MEDICATIONS  (PRN):  LORazepam   Injectable 1 milliGRAM(s) IV Push every 1 hour PRN CIWA-Ar score 8 or greater  melatonin 3 milliGRAM(s) Oral at bedtime PRN Insomnia      Vital Signs Last 24 Hrs  T(C): 36.5 (22 Sep 2023 20:34), Max: 36.9 (22 Sep 2023 15:17)  T(F): 97.7 (22 Sep 2023 20:34), Max: 98.4 (22 Sep 2023 15:17)  HR: 92 (22 Sep 2023 20:34) (73 - 93)  BP: 135/88 (22 Sep 2023 20:34) (127/88 - 139/94)  BP(mean): --  RR: 18 (22 Sep 2023 20:34) (18 - 18)  SpO2: 96% (22 Sep 2023 20:34) (95% - 98%)    Parameters below as of 22 Sep 2023 20:34  Patient On (Oxygen Delivery Method): room air        PHYSICAL EXAM:  General: in no acute distress  Eyes: EOMI intact bilaterally. Anicteric sclerae, pupils 6mm bilaterally  HENT: Moist mucous membranes  Neck: Trachea midline  Lungs: CTA B/L.  Cardiovascular: RRR  Abdomen: Soft, non-tender non-distended; No rebound or guarding  Extremities: tremors b/l hands on extension, WWP, no piloerection  Neurological: Alert and oriented  Skin: Warm and dry, no visible sweating      LABS:                        14.1   4.31  )-----------( 161      ( 22 Sep 2023 07:00 )             41.3     09-22    134<L>  |  101  |  13  ----------------------------<  89  3.7   |  23  |  0.83    Ca    9.1      22 Sep 2023 07:00  Phos  2.4     09-22  Mg     1.9     09-22    TPro  7.2  /  Alb  3.9  /  TBili  1.3<H>  /  DBili  x   /  AST  130<H>  /  ALT  100<H>  /  AlkPhos  82  09-22      Urinalysis Basic - ( 22 Sep 2023 07:00 )    Color: x / Appearance: x / SG: x / pH: x  Gluc: 89 mg/dL / Ketone: x  / Bili: x / Urobili: x   Blood: x / Protein: x / Nitrite: x   Leuk Esterase: x / RBC: x / WBC x   Sq Epi: x / Non Sq Epi: x / Bacteria: x        MICROBIOLOGY:    RADIOLOGY & ADDITIONAL STUDIES: *****INCOMPLETE NOTE*****    INTERVAL HPI/OVERNIGHT EVENTS: beth    SUBJECTIVE: Patient seen and examined at bedside, resting comfortably in bed, and does not appear to be in any acute distress. Patient states that he would like to go this evening. Spoke to the patient that he is still being tapered on the librium. STates that he is anxious because his mother may be discharged today. Denies any headaches, nausea, diaphoresis, hallucinations, or tactile sensations.    ANTIBIOTICS/RELEVANT:    MEDICATIONS  (STANDING):  chlordiazePOXIDE 50 milliGRAM(s) Oral every 8 hours  folic acid 1 milliGRAM(s) Oral daily  multivitamin 1 Tablet(s) Oral daily  sodium chloride 0.9%. 1000 milliLiter(s) (100 mL/Hr) IV Continuous <Continuous>  thiamine 100 milliGRAM(s) Oral daily    MEDICATIONS  (PRN):  LORazepam   Injectable 1 milliGRAM(s) IV Push every 1 hour PRN CIWA-Ar score 8 or greater  melatonin 3 milliGRAM(s) Oral at bedtime PRN Insomnia      Vital Signs Last 24 Hrs  T(C): 36.5 (22 Sep 2023 20:34), Max: 36.9 (22 Sep 2023 15:17)  T(F): 97.7 (22 Sep 2023 20:34), Max: 98.4 (22 Sep 2023 15:17)  HR: 92 (22 Sep 2023 20:34) (73 - 93)  BP: 135/88 (22 Sep 2023 20:34) (127/88 - 139/94)  BP(mean): --  RR: 18 (22 Sep 2023 20:34) (18 - 18)  SpO2: 96% (22 Sep 2023 20:34) (95% - 98%)    Parameters below as of 22 Sep 2023 20:34  Patient On (Oxygen Delivery Method): room air        PHYSICAL EXAM:  General: in no acute distress  Eyes: EOMI intact bilaterally. Anicteric sclerae, pupils 6mm bilaterally  HENT: Moist mucous membranes  Neck: Trachea midline  Lungs: CTA B/L.  Cardiovascular: RRR  Abdomen: Soft, non-tender non-distended; No rebound or guarding  Extremities: tremors b/l hands on extension, WWP, no piloerection  Neurological: Alert and oriented  Skin: Warm and dry, no visible sweating      LABS:                        14.1   4.31  )-----------( 161      ( 22 Sep 2023 07:00 )             41.3     09-22    134<L>  |  101  |  13  ----------------------------<  89  3.7   |  23  |  0.83    Ca    9.1      22 Sep 2023 07:00  Phos  2.4     09-22  Mg     1.9     09-22    TPro  7.2  /  Alb  3.9  /  TBili  1.3<H>  /  DBili  x   /  AST  130<H>  /  ALT  100<H>  /  AlkPhos  82  09-22      Urinalysis Basic - ( 22 Sep 2023 07:00 )    Color: x / Appearance: x / SG: x / pH: x  Gluc: 89 mg/dL / Ketone: x  / Bili: x / Urobili: x   Blood: x / Protein: x / Nitrite: x   Leuk Esterase: x / RBC: x / WBC x   Sq Epi: x / Non Sq Epi: x / Bacteria: x        MICROBIOLOGY:    RADIOLOGY & ADDITIONAL STUDIES: *****INCOMPLETE NOTE*****    INTERVAL HPI/OVERNIGHT EVENTS: beth    SUBJECTIVE: Patient seen and examined at bedside, resting comfortably in bed, and does not appear to be in any acute distress. Patient states that he would like to go this evening. Spoke to the patient that he is still being tapered on the librium. STates that he is anxious because his mother may be discharged today. Denies any headaches, nausea, diaphoresis, hallucinations, or tactile sensations.    MEDICATIONS  (STANDING):  chlordiazePOXIDE 50 milliGRAM(s) Oral every 8 hours  folic acid 1 milliGRAM(s) Oral daily  multivitamin 1 Tablet(s) Oral daily  sodium chloride 0.9%. 1000 milliLiter(s) (100 mL/Hr) IV Continuous <Continuous>  thiamine 100 milliGRAM(s) Oral daily    MEDICATIONS  (PRN):  LORazepam   Injectable 1 milliGRAM(s) IV Push every 1 hour PRN CIWA-Ar score 8 or greater  melatonin 3 milliGRAM(s) Oral at bedtime PRN Insomnia      Vital Signs Last 24 Hrs  T(C): 36.5 (22 Sep 2023 20:34), Max: 36.9 (22 Sep 2023 15:17)  T(F): 97.7 (22 Sep 2023 20:34), Max: 98.4 (22 Sep 2023 15:17)  HR: 92 (22 Sep 2023 20:34) (73 - 93)  BP: 135/88 (22 Sep 2023 20:34) (127/88 - 139/94)  BP(mean): --  RR: 18 (22 Sep 2023 20:34) (18 - 18)  SpO2: 96% (22 Sep 2023 20:34) (95% - 98%)    Parameters below as of 22 Sep 2023 20:34  Patient On (Oxygen Delivery Method): room air        PHYSICAL EXAM:  General: in no acute distress  Eyes: EOMI intact bilaterally. Anicteric sclerae, pupils 6mm bilaterally  HENT: Moist mucous membranes  Neck: Trachea midline  Lungs: CTA B/L.  Cardiovascular: RRR  Abdomen: Soft, non-tender non-distended; No rebound or guarding  Extremities: no tremors, WWP, no piloerection  Neurological: Alert and oriented  Skin: Warm and dry, no visible sweating      LABS:                        14.1   4.31  )-----------( 161      ( 22 Sep 2023 07:00 )             41.3     09-22    134<L>  |  101  |  13  ----------------------------<  89  3.7   |  23  |  0.83    Ca    9.1      22 Sep 2023 07:00  Phos  2.4     09-22  Mg     1.9     09-22    TPro  7.2  /  Alb  3.9  /  TBili  1.3<H>  /  DBili  x   /  AST  130<H>  /  ALT  100<H>  /  AlkPhos  82  09-22      Urinalysis Basic - ( 22 Sep 2023 07:00 )    Color: x / Appearance: x / SG: x / pH: x  Gluc: 89 mg/dL / Ketone: x  / Bili: x / Urobili: x   Blood: x / Protein: x / Nitrite: x   Leuk Esterase: x / RBC: x / WBC x   Sq Epi: x / Non Sq Epi: x / Bacteria: x        MICROBIOLOGY:    RADIOLOGY & ADDITIONAL STUDIES: INTERVAL HPI/OVERNIGHT EVENTS: beth    SUBJECTIVE: Patient seen and examined at bedside, resting comfortably in bed, and does not appear to be in any acute distress. Patient states that he would like to go this evening. Spoke to the patient that he is still being tapered on the librium. STates that he is anxious because his mother may be discharged today. Denies any headaches, nausea, diaphoresis, hallucinations, or tactile sensations.    MEDICATIONS  (STANDING):  chlordiazePOXIDE 50 milliGRAM(s) Oral every 8 hours  folic acid 1 milliGRAM(s) Oral daily  multivitamin 1 Tablet(s) Oral daily  sodium chloride 0.9%. 1000 milliLiter(s) (100 mL/Hr) IV Continuous <Continuous>  thiamine 100 milliGRAM(s) Oral daily    MEDICATIONS  (PRN):  LORazepam   Injectable 1 milliGRAM(s) IV Push every 1 hour PRN CIWA-Ar score 8 or greater  melatonin 3 milliGRAM(s) Oral at bedtime PRN Insomnia      Vital Signs Last 24 Hrs  T(C): 36.5 (22 Sep 2023 20:34), Max: 36.9 (22 Sep 2023 15:17)  T(F): 97.7 (22 Sep 2023 20:34), Max: 98.4 (22 Sep 2023 15:17)  HR: 92 (22 Sep 2023 20:34) (73 - 93)  BP: 135/88 (22 Sep 2023 20:34) (127/88 - 139/94)  BP(mean): --  RR: 18 (22 Sep 2023 20:34) (18 - 18)  SpO2: 96% (22 Sep 2023 20:34) (95% - 98%)    Parameters below as of 22 Sep 2023 20:34  Patient On (Oxygen Delivery Method): room air        PHYSICAL EXAM:  General: in no acute distress  Eyes: EOMI intact bilaterally. Anicteric sclerae, pupils 6mm bilaterally  HENT: Moist mucous membranes  Neck: Trachea midline  Lungs: CTA B/L.  Cardiovascular: RRR  Abdomen: Soft, non-tender non-distended; No rebound or guarding  Extremities: no tremors, WWP, no piloerection  Neurological: Alert and oriented  Skin: Warm and dry, no visible sweating      LABS:                        14.1   4.31  )-----------( 161      ( 22 Sep 2023 07:00 )             41.3     09-22    134<L>  |  101  |  13  ----------------------------<  89  3.7   |  23  |  0.83    Ca    9.1      22 Sep 2023 07:00  Phos  2.4     09-22  Mg     1.9     09-22    TPro  7.2  /  Alb  3.9  /  TBili  1.3<H>  /  DBili  x   /  AST  130<H>  /  ALT  100<H>  /  AlkPhos  82  09-22      Urinalysis Basic - ( 22 Sep 2023 07:00 )    Color: x / Appearance: x / SG: x / pH: x  Gluc: 89 mg/dL / Ketone: x  / Bili: x / Urobili: x   Blood: x / Protein: x / Nitrite: x   Leuk Esterase: x / RBC: x / WBC x   Sq Epi: x / Non Sq Epi: x / Bacteria: x        MICROBIOLOGY:    RADIOLOGY & ADDITIONAL STUDIES:

## 2023-09-23 NOTE — PROGRESS NOTE ADULT - PROBLEM SELECTOR PLAN 5
- K 3.7 this AM after repletion, up from 3.0 yesterday  - Replete as needed K 3.7 yesterday, today at 3.8, now stable    - Replete as needed

## 2023-09-23 NOTE — PROGRESS NOTE ADULT - PROBLEM SELECTOR PLAN 6
F: s/p ns 100ml/hr for 5 hrs  E: replete PRN  Diet: regular  GI PPx: none  DVT PPx: none given  Code: FULL  Dispo: [facility]

## 2023-09-23 NOTE — PROGRESS NOTE ADULT - PROBLEM SELECTOR PLAN 4
Most likely in the setting of alcohol abuse. AST at 130, ALT at 100.    - See above  - Avoid hepatotoxic meds  - f/u hepatitis panel Most likely in the setting of alcohol abuse. AST at 130, ALT at 100. hepatitis panel non reactive    - See above  - Avoid hepatotoxic meds  - continue to trend Most likely in the setting of alcohol abuse. AST at 130, ALT at 100. hepatitis panel non reactive    - See above  - f/u RUQUS  - Avoid hepatotoxic meds  - continue to trend

## 2023-09-28 DIAGNOSIS — F10.139 ALCOHOL ABUSE WITH WITHDRAWAL, UNSPECIFIED: ICD-10-CM

## 2023-09-28 DIAGNOSIS — R79.89 OTHER SPECIFIED ABNORMAL FINDINGS OF BLOOD CHEMISTRY: ICD-10-CM

## 2023-09-28 DIAGNOSIS — F41.9 ANXIETY DISORDER, UNSPECIFIED: ICD-10-CM

## 2023-09-28 DIAGNOSIS — E87.6 HYPOKALEMIA: ICD-10-CM

## 2023-09-28 DIAGNOSIS — E80.7 DISORDER OF BILIRUBIN METABOLISM, UNSPECIFIED: ICD-10-CM

## 2023-10-08 PROCEDURE — 85027 COMPLETE CBC AUTOMATED: CPT

## 2023-10-08 PROCEDURE — 85025 COMPLETE CBC W/AUTO DIFF WBC: CPT

## 2023-10-08 PROCEDURE — 87340 HEPATITIS B SURFACE AG IA: CPT

## 2023-10-08 PROCEDURE — 80048 BASIC METABOLIC PNL TOTAL CA: CPT

## 2023-10-08 PROCEDURE — 36415 COLL VENOUS BLD VENIPUNCTURE: CPT

## 2023-10-08 PROCEDURE — 86706 HEP B SURFACE ANTIBODY: CPT

## 2023-10-08 PROCEDURE — 86709 HEPATITIS A IGM ANTIBODY: CPT

## 2023-10-08 PROCEDURE — 83690 ASSAY OF LIPASE: CPT

## 2023-10-08 PROCEDURE — 84100 ASSAY OF PHOSPHORUS: CPT

## 2023-10-08 PROCEDURE — 80053 COMPREHEN METABOLIC PANEL: CPT

## 2023-10-08 PROCEDURE — 86705 HEP B CORE ANTIBODY IGM: CPT

## 2023-10-08 PROCEDURE — 83735 ASSAY OF MAGNESIUM: CPT

## 2023-10-08 PROCEDURE — 82248 BILIRUBIN DIRECT: CPT

## 2023-10-08 PROCEDURE — 96375 TX/PRO/DX INJ NEW DRUG ADDON: CPT

## 2023-10-08 PROCEDURE — 96374 THER/PROPH/DIAG INJ IV PUSH: CPT

## 2023-10-08 PROCEDURE — 99291 CRITICAL CARE FIRST HOUR: CPT

## 2023-10-08 PROCEDURE — 80076 HEPATIC FUNCTION PANEL: CPT

## 2023-10-08 PROCEDURE — 82962 GLUCOSE BLOOD TEST: CPT

## 2023-10-08 PROCEDURE — 86704 HEP B CORE ANTIBODY TOTAL: CPT

## 2023-10-08 PROCEDURE — 86803 HEPATITIS C AB TEST: CPT

## 2023-10-08 PROCEDURE — 93005 ELECTROCARDIOGRAM TRACING: CPT

## 2023-10-08 PROCEDURE — 82247 BILIRUBIN TOTAL: CPT
